# Patient Record
Sex: MALE | Race: WHITE | NOT HISPANIC OR LATINO | Employment: OTHER | URBAN - NONMETROPOLITAN AREA
[De-identification: names, ages, dates, MRNs, and addresses within clinical notes are randomized per-mention and may not be internally consistent; named-entity substitution may affect disease eponyms.]

---

## 2024-07-11 ENCOUNTER — HOSPITAL ENCOUNTER (EMERGENCY)
Facility: HOSPITAL | Age: 64
Discharge: OTHER INSTITUTION | DRG: 897 | End: 2024-07-12
Attending: EMERGENCY MEDICINE
Payer: MEDICAID

## 2024-07-11 DIAGNOSIS — F10.10 ALCOHOL ABUSE: Primary | ICD-10-CM

## 2024-07-11 LAB
ALBUMIN SERPL-MCNC: 3.5 G/DL (ref 3.5–5.2)
ALBUMIN/GLOB SERPL: 0.9 G/DL
ALP SERPL-CCNC: 109 U/L (ref 39–117)
ALT SERPL W P-5'-P-CCNC: 25 U/L (ref 1–41)
AMPHET+METHAMPHET UR QL: NEGATIVE
AMPHETAMINES UR QL: NEGATIVE
ANION GAP SERPL CALCULATED.3IONS-SCNC: 13.1 MMOL/L (ref 5–15)
AST SERPL-CCNC: 75 U/L (ref 1–40)
BARBITURATES UR QL SCN: NEGATIVE
BASOPHILS # BLD AUTO: 0.2 10*3/MM3 (ref 0–0.2)
BASOPHILS NFR BLD AUTO: 2.1 % (ref 0–1.5)
BENZODIAZ UR QL SCN: NEGATIVE
BILIRUB SERPL-MCNC: 0.3 MG/DL (ref 0–1.2)
BILIRUB UR QL STRIP: NEGATIVE
BUN SERPL-MCNC: 13 MG/DL (ref 8–23)
BUN/CREAT SERPL: 17.3 (ref 7–25)
BUPRENORPHINE SERPL-MCNC: NEGATIVE NG/ML
CALCIUM SPEC-SCNC: 8.7 MG/DL (ref 8.6–10.5)
CANNABINOIDS SERPL QL: POSITIVE
CHLORIDE SERPL-SCNC: 104 MMOL/L (ref 98–107)
CLARITY UR: CLEAR
CO2 SERPL-SCNC: 26.9 MMOL/L (ref 22–29)
COCAINE UR QL: NEGATIVE
COLOR UR: YELLOW
CREAT SERPL-MCNC: 0.75 MG/DL (ref 0.76–1.27)
DEPRECATED RDW RBC AUTO: 66.7 FL (ref 37–54)
EGFRCR SERPLBLD CKD-EPI 2021: 102 ML/MIN/1.73
EOSINOPHIL # BLD AUTO: 0.47 10*3/MM3 (ref 0–0.4)
EOSINOPHIL NFR BLD AUTO: 4.9 % (ref 0.3–6.2)
ERYTHROCYTE [DISTWIDTH] IN BLOOD BY AUTOMATED COUNT: 19.5 % (ref 12.3–15.4)
ETHANOL BLD-MCNC: 109 MG/DL (ref 0–10)
ETHANOL BLD-MCNC: 237 MG/DL (ref 0–10)
ETHANOL BLD-MCNC: 400 MG/DL (ref 0–10)
ETHANOL BLD-MCNC: 67 MG/DL (ref 0–10)
ETHANOL UR QL: 0.07 %
ETHANOL UR QL: 0.11 %
ETHANOL UR QL: 0.24 %
ETHANOL UR QL: 0.4 %
FENTANYL UR-MCNC: NEGATIVE NG/ML
GLOBULIN UR ELPH-MCNC: 3.8 GM/DL
GLUCOSE SERPL-MCNC: 105 MG/DL (ref 65–99)
GLUCOSE UR STRIP-MCNC: ABNORMAL MG/DL
HCT VFR BLD AUTO: 32.3 % (ref 37.5–51)
HGB BLD-MCNC: 10.1 G/DL (ref 13–17.7)
HGB UR QL STRIP.AUTO: NEGATIVE
IMM GRANULOCYTES # BLD AUTO: 0.05 10*3/MM3 (ref 0–0.05)
IMM GRANULOCYTES NFR BLD AUTO: 0.5 % (ref 0–0.5)
KETONES UR QL STRIP: NEGATIVE
LEUKOCYTE ESTERASE UR QL STRIP.AUTO: NEGATIVE
LYMPHOCYTES # BLD AUTO: 3.13 10*3/MM3 (ref 0.7–3.1)
LYMPHOCYTES NFR BLD AUTO: 32.5 % (ref 19.6–45.3)
MAGNESIUM SERPL-MCNC: 2 MG/DL (ref 1.6–2.4)
MCH RBC QN AUTO: 30 PG (ref 26.6–33)
MCHC RBC AUTO-ENTMCNC: 31.3 G/DL (ref 31.5–35.7)
MCV RBC AUTO: 95.8 FL (ref 79–97)
METHADONE UR QL SCN: NEGATIVE
MONOCYTES # BLD AUTO: 0.79 10*3/MM3 (ref 0.1–0.9)
MONOCYTES NFR BLD AUTO: 8.2 % (ref 5–12)
NEUTROPHILS NFR BLD AUTO: 5 10*3/MM3 (ref 1.7–7)
NEUTROPHILS NFR BLD AUTO: 51.8 % (ref 42.7–76)
NITRITE UR QL STRIP: NEGATIVE
NRBC BLD AUTO-RTO: 0 /100 WBC (ref 0–0.2)
OPIATES UR QL: NEGATIVE
OXYCODONE UR QL SCN: NEGATIVE
PCP UR QL SCN: NEGATIVE
PH UR STRIP.AUTO: 6.5 [PH] (ref 5–8)
PLATELET # BLD AUTO: 219 10*3/MM3 (ref 140–450)
PMV BLD AUTO: 8.8 FL (ref 6–12)
POTASSIUM SERPL-SCNC: 3.6 MMOL/L (ref 3.5–5.2)
PROT SERPL-MCNC: 7.3 G/DL (ref 6–8.5)
PROT UR QL STRIP: ABNORMAL
RBC # BLD AUTO: 3.37 10*6/MM3 (ref 4.14–5.8)
SODIUM SERPL-SCNC: 144 MMOL/L (ref 136–145)
SP GR UR STRIP: 1.01 (ref 1–1.03)
TRICYCLICS UR QL SCN: NEGATIVE
UROBILINOGEN UR QL STRIP: ABNORMAL
WBC NRBC COR # BLD AUTO: 9.64 10*3/MM3 (ref 3.4–10.8)

## 2024-07-11 PROCEDURE — 63710000001 DIPHENHYDRAMINE PER 50 MG: Performed by: NURSE PRACTITIONER

## 2024-07-11 PROCEDURE — 82077 ASSAY SPEC XCP UR&BREATH IA: CPT | Performed by: PHYSICIAN ASSISTANT

## 2024-07-11 PROCEDURE — 80307 DRUG TEST PRSMV CHEM ANLYZR: CPT | Performed by: PHYSICIAN ASSISTANT

## 2024-07-11 PROCEDURE — 36415 COLL VENOUS BLD VENIPUNCTURE: CPT

## 2024-07-11 PROCEDURE — 80053 COMPREHEN METABOLIC PANEL: CPT | Performed by: PHYSICIAN ASSISTANT

## 2024-07-11 PROCEDURE — 25810000003 SODIUM CHLORIDE 0.9 % SOLUTION: Performed by: PHYSICIAN ASSISTANT

## 2024-07-11 PROCEDURE — 83735 ASSAY OF MAGNESIUM: CPT | Performed by: PHYSICIAN ASSISTANT

## 2024-07-11 PROCEDURE — 25010000002 ONDANSETRON PER 1 MG: Performed by: PHYSICIAN ASSISTANT

## 2024-07-11 PROCEDURE — 99285 EMERGENCY DEPT VISIT HI MDM: CPT

## 2024-07-11 PROCEDURE — 96365 THER/PROPH/DIAG IV INF INIT: CPT

## 2024-07-11 PROCEDURE — 82077 ASSAY SPEC XCP UR&BREATH IA: CPT | Performed by: NURSE PRACTITIONER

## 2024-07-11 PROCEDURE — 96375 TX/PRO/DX INJ NEW DRUG ADDON: CPT

## 2024-07-11 PROCEDURE — 85025 COMPLETE CBC W/AUTO DIFF WBC: CPT | Performed by: PHYSICIAN ASSISTANT

## 2024-07-11 PROCEDURE — 81003 URINALYSIS AUTO W/O SCOPE: CPT | Performed by: PHYSICIAN ASSISTANT

## 2024-07-11 PROCEDURE — 96361 HYDRATE IV INFUSION ADD-ON: CPT

## 2024-07-11 PROCEDURE — 25010000002 THIAMINE HCL 200 MG/2ML SOLUTION: Performed by: PHYSICIAN ASSISTANT

## 2024-07-11 RX ORDER — ONDANSETRON 2 MG/ML
4 INJECTION INTRAMUSCULAR; INTRAVENOUS ONCE
Status: COMPLETED | OUTPATIENT
Start: 2024-07-11 | End: 2024-07-11

## 2024-07-11 RX ORDER — DIPHENHYDRAMINE HCL 25 MG
25 CAPSULE ORAL ONCE
Status: COMPLETED | OUTPATIENT
Start: 2024-07-11 | End: 2024-07-11

## 2024-07-11 RX ORDER — LORAZEPAM 2 MG/1
2 TABLET ORAL ONCE
Status: COMPLETED | OUTPATIENT
Start: 2024-07-11 | End: 2024-07-11

## 2024-07-11 RX ORDER — SODIUM CHLORIDE 0.9 % (FLUSH) 0.9 %
10 SYRINGE (ML) INJECTION AS NEEDED
Status: DISCONTINUED | OUTPATIENT
Start: 2024-07-11 | End: 2024-07-12 | Stop reason: HOSPADM

## 2024-07-11 RX ORDER — SODIUM CHLORIDE 9 MG/ML
1000 INJECTION, SOLUTION INTRAVENOUS ONCE
Status: COMPLETED | OUTPATIENT
Start: 2024-07-11 | End: 2024-07-11

## 2024-07-11 RX ORDER — CHLORDIAZEPOXIDE HYDROCHLORIDE 25 MG/1
50 CAPSULE, GELATIN COATED ORAL ONCE
Status: COMPLETED | OUTPATIENT
Start: 2024-07-11 | End: 2024-07-11

## 2024-07-11 RX ORDER — THIAMINE HYDROCHLORIDE 100 MG/ML
200 INJECTION, SOLUTION INTRAMUSCULAR; INTRAVENOUS ONCE
Status: COMPLETED | OUTPATIENT
Start: 2024-07-11 | End: 2024-07-11

## 2024-07-11 RX ADMIN — THIAMINE HYDROCHLORIDE 200 MG: 100 INJECTION, SOLUTION INTRAMUSCULAR; INTRAVENOUS at 10:29

## 2024-07-11 RX ADMIN — SODIUM CHLORIDE 1000 ML: 9 INJECTION, SOLUTION INTRAVENOUS at 10:30

## 2024-07-11 RX ADMIN — CHLORDIAZEPOXIDE HYDROCHLORIDE 50 MG: 25 CAPSULE ORAL at 23:11

## 2024-07-11 RX ADMIN — LORAZEPAM 2 MG: 2 TABLET ORAL at 19:41

## 2024-07-11 RX ADMIN — ONDANSETRON 4 MG: 2 INJECTION INTRAMUSCULAR; INTRAVENOUS at 19:41

## 2024-07-11 RX ADMIN — DIPHENHYDRAMINE HYDROCHLORIDE 25 MG: 25 CAPSULE ORAL at 23:11

## 2024-07-11 RX ADMIN — FOLIC ACID 1 MG: 5 INJECTION, SOLUTION INTRAMUSCULAR; INTRAVENOUS; SUBCUTANEOUS at 10:29

## 2024-07-11 NOTE — ED PROVIDER NOTES
"Subjective   History of Present Illness  63 y/o male that presents to emergency department with c/c \"Alcohol abuse\" X several days. Patient states that drank just prior to arrival.  Patient does state that he is suicidal. Patient denies any other associated complaints.     History provided by:  Patient   used: No    Alcohol Intoxication  Location:  Alcohol use  Severity:  Moderate  Onset quality:  Gradual  Duration:  2 days  Timing:  Intermittent  Progression:  Worsening  Chronicity:  New  Associated symptoms: no chest pain, no cough, no diarrhea, no ear pain, no fever, no headaches, no loss of consciousness, no myalgias, no nausea, no rhinorrhea, no shortness of breath, no sore throat and no vomiting    Mental Health Problem  Presenting symptoms: agitation and suicidal thoughts    Presenting symptoms: no delusions, no depression, no disorganized speech, no homicidal ideas and no paranoid behavior    Patient accompanied by:  Caregiver  Degree of incapacity (severity):  Moderate  Onset quality:  Gradual  Duration:  2 days  Timing:  Intermittent  Progression:  Worsening  Chronicity:  New  Context: not alcohol use, not drug abuse, not noncompliant and not recent medication change    Treatment compliance:  Untreated  Time since last psychoactive medication taken:  2 days  Relieved by:  Nothing  Associated symptoms: no appetite change, no chest pain, no headaches, no hyperventilation and no insomnia    Risk factors: no family hx of mental illness, no family violence, no hx of mental illness, no hx of suicide attempts and no neurological disease        Review of Systems   Constitutional: Negative.  Negative for appetite change and fever.   HENT: Negative.  Negative for ear pain, rhinorrhea and sore throat.    Eyes: Negative.  Negative for photophobia, pain and itching.   Respiratory: Negative.  Negative for cough and shortness of breath.    Cardiovascular: Negative.  Negative for chest pain. "   Gastrointestinal: Negative.  Negative for diarrhea, nausea and vomiting.   Endocrine: Negative.  Negative for heat intolerance, polydipsia and polyphagia.   Genitourinary: Negative.  Negative for flank pain, frequency, hematuria, penile pain and scrotal swelling.   Musculoskeletal: Negative.  Negative for back pain, gait problem and myalgias.   Skin: Negative.  Negative for pallor.   Allergic/Immunologic: Negative.    Neurological: Negative.  Negative for loss of consciousness, speech difficulty, numbness and headaches.   Hematological: Negative.    Psychiatric/Behavioral: Negative.  Positive for agitation and suicidal ideas. Negative for behavioral problems, confusion, decreased concentration, dysphoric mood, homicidal ideas and paranoia. The patient does not have insomnia.    All other systems reviewed and are negative.      History reviewed. No pertinent past medical history.    No Known Allergies    History reviewed. No pertinent surgical history.    History reviewed. No pertinent family history.    Social History     Socioeconomic History    Marital status:            Objective   Physical Exam  Vitals and nursing note reviewed.   Constitutional:       General: He is not in acute distress.     Appearance: Normal appearance. He is normal weight. He is not ill-appearing, toxic-appearing or diaphoretic.   HENT:      Head: Normocephalic and atraumatic.      Right Ear: Tympanic membrane, ear canal and external ear normal. There is no impacted cerumen.      Left Ear: Tympanic membrane, ear canal and external ear normal. There is no impacted cerumen.      Nose: Nose normal. No congestion or rhinorrhea.      Mouth/Throat:      Mouth: Mucous membranes are moist.      Pharynx: Oropharynx is clear. No oropharyngeal exudate.   Eyes:      General: No scleral icterus.        Right eye: No discharge.         Left eye: No discharge.      Extraocular Movements: Extraocular movements intact.      Conjunctiva/sclera:  Conjunctivae normal.      Pupils: Pupils are equal, round, and reactive to light.   Cardiovascular:      Rate and Rhythm: Normal rate and regular rhythm.      Pulses: Normal pulses.      Heart sounds: Normal heart sounds. No murmur heard.     No friction rub. No gallop.   Pulmonary:      Effort: Pulmonary effort is normal. No respiratory distress.      Breath sounds: Normal breath sounds. No stridor. No wheezing, rhonchi or rales.   Abdominal:      General: Abdomen is flat. Bowel sounds are normal. There is no distension.      Palpations: Abdomen is soft. There is no mass.      Tenderness: There is no abdominal tenderness. There is no right CVA tenderness, guarding or rebound.      Hernia: No hernia is present.   Musculoskeletal:         General: No swelling, tenderness, deformity or signs of injury. Normal range of motion.      Cervical back: Normal range of motion and neck supple. No rigidity or tenderness.      Right lower leg: No edema.   Lymphadenopathy:      Cervical: No cervical adenopathy.   Skin:     General: Skin is warm and dry.      Capillary Refill: Capillary refill takes less than 2 seconds.      Coloration: Skin is not jaundiced or pale.      Findings: No bruising, erythema or lesion.   Neurological:      General: No focal deficit present.      Mental Status: He is alert and oriented to person, place, and time. Mental status is at baseline.      Cranial Nerves: No cranial nerve deficit.      Sensory: No sensory deficit.      Motor: No weakness.      Coordination: Coordination normal.      Gait: Gait normal.   Psychiatric:         Mood and Affect: Mood normal.         Behavior: Behavior normal.         Thought Content: Thought content includes suicidal ideation. Thought content includes suicidal plan.         Judgment: Judgment normal.         Procedures           ED Course                                             Medical Decision Making  Amount and/or Complexity of Data Reviewed  Labs:  ordered.    Risk  Prescription drug management.        Final diagnoses:   Alcohol abuse       ED Disposition  ED Disposition       ED Disposition   DC/Transfer to Behavioral Health    Condition   Stable    Comment   --               No follow-up provider specified.       Medication List      No changes were made to your prescriptions during this visit.            Bonifacio Quinonez PA-C  07/11/24 [id number]

## 2024-07-12 ENCOUNTER — HOSPITAL ENCOUNTER (INPATIENT)
Facility: HOSPITAL | Age: 64
LOS: 6 days | Discharge: HOME | DRG: 897 | End: 2024-07-18
Attending: STUDENT IN AN ORGANIZED HEALTH CARE EDUCATION/TRAINING PROGRAM | Admitting: STUDENT IN AN ORGANIZED HEALTH CARE EDUCATION/TRAINING PROGRAM
Payer: MEDICAID

## 2024-07-12 VITALS
SYSTOLIC BLOOD PRESSURE: 136 MMHG | BODY MASS INDEX: 24.25 KG/M2 | HEIGHT: 68 IN | DIASTOLIC BLOOD PRESSURE: 85 MMHG | WEIGHT: 160 LBS | OXYGEN SATURATION: 100 % | TEMPERATURE: 98.6 F | RESPIRATION RATE: 18 BRPM | HEART RATE: 104 BPM

## 2024-07-12 DIAGNOSIS — T14.8XXA WOUND OF SKIN: Primary | ICD-10-CM

## 2024-07-12 PROBLEM — F10.10 ALCOHOL ABUSE: Status: ACTIVE | Noted: 2024-07-12

## 2024-07-12 PROBLEM — F10.20 ALCOHOL USE DISORDER, SEVERE, DEPENDENCE: Status: ACTIVE | Noted: 2024-07-12

## 2024-07-12 PROBLEM — F10.939 ALCOHOL WITHDRAWAL: Status: ACTIVE | Noted: 2024-07-12

## 2024-07-12 LAB
HAV IGM SERPL QL IA: NORMAL
HBV CORE IGM SERPL QL IA: NORMAL
HBV SURFACE AG SERPL QL IA: NORMAL
HCV AB SER QL: NORMAL
QT INTERVAL: 378 MS
QTC INTERVAL: 504 MS

## 2024-07-12 PROCEDURE — 93005 ELECTROCARDIOGRAM TRACING: CPT | Performed by: STUDENT IN AN ORGANIZED HEALTH CARE EDUCATION/TRAINING PROGRAM

## 2024-07-12 PROCEDURE — 80074 ACUTE HEPATITIS PANEL: CPT | Performed by: STUDENT IN AN ORGANIZED HEALTH CARE EDUCATION/TRAINING PROGRAM

## 2024-07-12 PROCEDURE — HZ2ZZZZ DETOXIFICATION SERVICES FOR SUBSTANCE ABUSE TREATMENT: ICD-10-PCS | Performed by: PSYCHIATRY & NEUROLOGY

## 2024-07-12 PROCEDURE — 99223 1ST HOSP IP/OBS HIGH 75: CPT | Performed by: PSYCHIATRY & NEUROLOGY

## 2024-07-12 PROCEDURE — 93010 ELECTROCARDIOGRAM REPORT: CPT | Performed by: SPECIALIST

## 2024-07-12 RX ORDER — FUROSEMIDE 40 MG/1
40 TABLET ORAL DAILY
COMMUNITY

## 2024-07-12 RX ORDER — LORAZEPAM 1 MG/1
1 TABLET ORAL EVERY 4 HOURS PRN
Status: ACTIVE | OUTPATIENT
Start: 2024-07-15 | End: 2024-07-16

## 2024-07-12 RX ORDER — SUCRALFATE 1 G/1
1 TABLET ORAL 3 TIMES DAILY
COMMUNITY

## 2024-07-12 RX ORDER — POLYETHYLENE GLYCOL 3350 17 G/17G
17 POWDER, FOR SOLUTION ORAL DAILY PRN
Status: DISCONTINUED | OUTPATIENT
Start: 2024-07-12 | End: 2024-07-18 | Stop reason: HOSPADM

## 2024-07-12 RX ORDER — BENZONATATE 100 MG/1
100 CAPSULE ORAL 3 TIMES DAILY PRN
Status: DISCONTINUED | OUTPATIENT
Start: 2024-07-12 | End: 2024-07-18 | Stop reason: HOSPADM

## 2024-07-12 RX ORDER — LORAZEPAM 2 MG/1
2 TABLET ORAL
Status: DISPENSED | OUTPATIENT
Start: 2024-07-12 | End: 2024-07-13

## 2024-07-12 RX ORDER — METOPROLOL SUCCINATE 50 MG/1
50 TABLET, EXTENDED RELEASE ORAL DAILY
Status: DISCONTINUED | OUTPATIENT
Start: 2024-07-13 | End: 2024-07-18 | Stop reason: HOSPADM

## 2024-07-12 RX ORDER — MULTIVITAMIN WITH IRON
2 TABLET ORAL DAILY
Status: DISCONTINUED | OUTPATIENT
Start: 2024-07-12 | End: 2024-07-18 | Stop reason: HOSPADM

## 2024-07-12 RX ORDER — TRAZODONE HYDROCHLORIDE 50 MG/1
50 TABLET ORAL NIGHTLY PRN
Status: DISCONTINUED | OUTPATIENT
Start: 2024-07-12 | End: 2024-07-18 | Stop reason: HOSPADM

## 2024-07-12 RX ORDER — FAMOTIDINE 20 MG/1
20 TABLET, FILM COATED ORAL 2 TIMES DAILY PRN
Status: DISCONTINUED | OUTPATIENT
Start: 2024-07-12 | End: 2024-07-18 | Stop reason: HOSPADM

## 2024-07-12 RX ORDER — METOPROLOL SUCCINATE 50 MG/1
50 TABLET, EXTENDED RELEASE ORAL DAILY
COMMUNITY

## 2024-07-12 RX ORDER — NICOTINE 21 MG/24HR
1 PATCH, TRANSDERMAL 24 HOURS TRANSDERMAL
Status: DISCONTINUED | OUTPATIENT
Start: 2024-07-12 | End: 2024-07-18 | Stop reason: HOSPADM

## 2024-07-12 RX ORDER — FUROSEMIDE 20 MG/1
40 TABLET ORAL DAILY
Status: DISCONTINUED | OUTPATIENT
Start: 2024-07-13 | End: 2024-07-18 | Stop reason: HOSPADM

## 2024-07-12 RX ORDER — MONTELUKAST SODIUM 10 MG/1
10 TABLET ORAL NIGHTLY
Status: DISCONTINUED | OUTPATIENT
Start: 2024-07-12 | End: 2024-07-18 | Stop reason: HOSPADM

## 2024-07-12 RX ORDER — BENZTROPINE MESYLATE 1 MG/1
2 TABLET ORAL ONCE AS NEEDED
Status: DISCONTINUED | OUTPATIENT
Start: 2024-07-12 | End: 2024-07-18 | Stop reason: HOSPADM

## 2024-07-12 RX ORDER — HYDROXYZINE 50 MG/1
50 TABLET, FILM COATED ORAL EVERY 6 HOURS PRN
Status: DISCONTINUED | OUTPATIENT
Start: 2024-07-12 | End: 2024-07-18 | Stop reason: HOSPADM

## 2024-07-12 RX ORDER — MONTELUKAST SODIUM 10 MG/1
10 TABLET ORAL NIGHTLY
COMMUNITY

## 2024-07-12 RX ORDER — SUCRALFATE 1 G/1
1 TABLET ORAL 3 TIMES DAILY
Status: DISCONTINUED | OUTPATIENT
Start: 2024-07-12 | End: 2024-07-18 | Stop reason: HOSPADM

## 2024-07-12 RX ORDER — ACETAMINOPHEN 325 MG/1
650 TABLET ORAL EVERY 6 HOURS PRN
Status: DISCONTINUED | OUTPATIENT
Start: 2024-07-12 | End: 2024-07-18 | Stop reason: HOSPADM

## 2024-07-12 RX ORDER — LORAZEPAM 2 MG/1
2 TABLET ORAL EVERY 4 HOURS PRN
Status: DISPENSED | OUTPATIENT
Start: 2024-07-13 | End: 2024-07-14

## 2024-07-12 RX ORDER — LORAZEPAM 1 MG/1
1 TABLET ORAL
Status: COMPLETED | OUTPATIENT
Start: 2024-07-15 | End: 2024-07-15

## 2024-07-12 RX ORDER — LORAZEPAM 0.5 MG/1
0.5 TABLET ORAL EVERY 4 HOURS PRN
Status: ACTIVE | OUTPATIENT
Start: 2024-07-16 | End: 2024-07-17

## 2024-07-12 RX ORDER — ONDANSETRON 4 MG/1
4 TABLET, ORALLY DISINTEGRATING ORAL EVERY 6 HOURS PRN
Status: DISCONTINUED | OUTPATIENT
Start: 2024-07-12 | End: 2024-07-18 | Stop reason: HOSPADM

## 2024-07-12 RX ORDER — MULTIPLE VITAMINS W/ MINERALS TAB 9MG-400MCG
1 TAB ORAL DAILY
Status: DISCONTINUED | OUTPATIENT
Start: 2024-07-12 | End: 2024-07-18 | Stop reason: HOSPADM

## 2024-07-12 RX ORDER — LORAZEPAM 2 MG/1
2 TABLET ORAL
Status: COMPLETED | OUTPATIENT
Start: 2024-07-13 | End: 2024-07-13

## 2024-07-12 RX ORDER — LORAZEPAM 0.5 MG/1
0.5 TABLET ORAL
Status: COMPLETED | OUTPATIENT
Start: 2024-07-16 | End: 2024-07-16

## 2024-07-12 RX ORDER — ECHINACEA PURPUREA EXTRACT 125 MG
2 TABLET ORAL AS NEEDED
Status: DISCONTINUED | OUTPATIENT
Start: 2024-07-12 | End: 2024-07-18 | Stop reason: HOSPADM

## 2024-07-12 RX ORDER — ALUMINA, MAGNESIA, AND SIMETHICONE 2400; 2400; 240 MG/30ML; MG/30ML; MG/30ML
15 SUSPENSION ORAL EVERY 6 HOURS PRN
Status: DISCONTINUED | OUTPATIENT
Start: 2024-07-12 | End: 2024-07-18 | Stop reason: HOSPADM

## 2024-07-12 RX ORDER — LOPERAMIDE HYDROCHLORIDE 2 MG/1
2 CAPSULE ORAL
Status: DISCONTINUED | OUTPATIENT
Start: 2024-07-12 | End: 2024-07-18 | Stop reason: HOSPADM

## 2024-07-12 RX ORDER — BENZTROPINE MESYLATE 1 MG/ML
1 INJECTION INTRAMUSCULAR; INTRAVENOUS ONCE AS NEEDED
Status: DISCONTINUED | OUTPATIENT
Start: 2024-07-12 | End: 2024-07-18 | Stop reason: HOSPADM

## 2024-07-12 RX ORDER — IBUPROFEN 400 MG/1
400 TABLET ORAL EVERY 6 HOURS PRN
Status: DISCONTINUED | OUTPATIENT
Start: 2024-07-12 | End: 2024-07-18 | Stop reason: HOSPADM

## 2024-07-12 RX ORDER — DAPAGLIFLOZIN 10 MG/1
10 TABLET, FILM COATED ORAL DAILY
COMMUNITY

## 2024-07-12 RX ORDER — LORAZEPAM 2 MG/1
4 TABLET ORAL ONCE
Status: COMPLETED | OUTPATIENT
Start: 2024-07-12 | End: 2024-07-12

## 2024-07-12 RX ADMIN — Medication 2 TABLET: at 08:42

## 2024-07-12 RX ADMIN — Medication 1 PATCH: at 08:43

## 2024-07-12 RX ADMIN — HYDROXYZINE HYDROCHLORIDE 50 MG: 50 TABLET ORAL at 21:31

## 2024-07-12 RX ADMIN — MONTELUKAST SODIUM 10 MG: 10 TABLET, COATED ORAL at 21:48

## 2024-07-12 RX ADMIN — SUCRALFATE 1 G: 1 TABLET ORAL at 21:48

## 2024-07-12 RX ADMIN — LORAZEPAM 2 MG: 2 TABLET ORAL at 21:31

## 2024-07-12 RX ADMIN — Medication 100 MG: at 08:42

## 2024-07-12 RX ADMIN — SACUBITRIL AND VALSARTAN 1 TABLET: 24; 26 TABLET, FILM COATED ORAL at 21:48

## 2024-07-12 RX ADMIN — Medication 1 TABLET: at 08:42

## 2024-07-12 RX ADMIN — LORAZEPAM 2 MG: 2 TABLET ORAL at 04:28

## 2024-07-12 RX ADMIN — TRAZODONE HYDROCHLORIDE 50 MG: 50 TABLET ORAL at 02:20

## 2024-07-12 RX ADMIN — HYDROXYZINE HYDROCHLORIDE 50 MG: 50 TABLET ORAL at 02:21

## 2024-07-12 RX ADMIN — LORAZEPAM 4 MG: 2 TABLET ORAL at 02:20

## 2024-07-12 NOTE — PLAN OF CARE
Goal Outcome Evaluation:        Problem: Adult Behavioral Health Plan of Care  Goal: Patient-Specific Goal (Individualization)  Outcome: Ongoing, Progressing  Flowsheets  Taken 7/12/2024 1028  Patient-Specific Goals (Include Timeframe): Identify 2-3 coping skills, address relapse prevention methods, complete aftercare plans, and deny SI/HI prior to discharge.  Individualized Care Needs: Therapist to offer 1-4 therapy sessions, aftercare planning, safety planning, family education, group therapy, and brief CBT/MI interventions.  Anxieties, Fears or Concerns: none verbalized  Taken 7/12/2024 1014  Patient Personal Strengths:   resilient   resourceful   motivated for treatment   motivated for recovery  Patient Vulnerabilities:   substance abuse/addiction   history of unsuccessful treatment   poor physical health   poor impulse control   lacks insight into illness  Goal: Optimized Coping Skills in Response to Life Stressors  Outcome: Ongoing, Progressing  Flowsheets (Taken 7/12/2024 1028)  Optimized Coping Skills in Response to Life Stressors: making progress toward outcome  Intervention: Promote Effective Coping Strategies  Flowsheets (Taken 7/12/2024 0720 by April Higgins RN)  Supportive Measures:   active listening utilized   counseling provided   decision-making supported   goal-setting facilitated   positive reinforcement provided   self-care encouraged   verbalization of feelings encouraged  Goal: Develops/Participates in Therapeutic [place] to Support Successful Transition  Outcome: Ongoing, Progressing  Flowsheets (Taken 7/12/2024 1028)  Develops/Participates in Therapeutic [place] to Support Successful Transition: making progress toward outcome  Intervention: Foster Therapeutic [place]  Flowsheets (Taken 7/12/2024 1028)  Trust Relationship/Rapport:   care explained   questions encouraged   choices provided   reassurance provided   emotional support provided   thoughts/feelings acknowledged   empathic  listening provided   questions answered  Intervention: Mutually Develop Transition Plan  Flowsheets  Taken 7/12/2024 1028  Outpatient/Agency/Support Group Needs:   residential services   outpatient substance abuse treatment (specify)   outpatient psychiatric care (specify)   intensive outpatient services   outpatient medication management   outpatient counseling  Transition Support:   follow-up care discussed   follow-up care coordinated   community resources reviewed   crisis management plan promoted   crisis management plan verbalized  Anticipated Discharge Disposition:   residential substance use unit   home or self-care  Taken 7/12/2024 1027  Discharge Coordination/Progress: Patient is Medicaid pending. Therapist met with patient to complete assessment.  Transportation Anticipated:   agency   family or friend will provide   public transportation  Transportation Concerns: none  Current Discharge Risk: substance use/abuse  Concerns to be Addressed:   substance/tobacco abuse/use   coping/stress   cognitive/perceptual   mental health   discharge planning  Readmission Within the Last 30 Days: no previous admission in last 30 days  Patient/Family Anticipated Services at Transition:   rehabilitation services   mental health services   outpatient care  Patient's Choice of Community Agency(s): To be determined  Patient/Family Anticipates Transition to:   inpatient rehabilitation facility   home  Offered/Gave Vendor List: yes      DATA:      Therapist met individually with patient this date to introduce role and to discuss hospitalization expectations. Patient agreeable.     Consent in chartlet for wife and son.     Clinical Maneuvering/Intervention:     Therapist assisted patient in processing session content; acknowledged and normalized patient’s thoughts, feelings, and concerns. Discussed the therapist/patient relationship and explain the parameters and limitations of relative confidentiality. Also discussed the  importance of active participation, and honesty to the treatment process. Encouraged the patient to discuss/vent their feelings, frustrations, and fears concerning their ongoing medical issues and validated their feelings.     Discussed the importance of finding enjoyable activities and coping skills that the patient can engage in a regular basis. Discussed healthy coping skills such as distraction, self love, grounding, thought challenges/reframing, etc. Provided patient with list of healthy coping skills this date. Discussed the importance of medication compliance. Praised the patient for seeking help and spent the majority of the session building rapport.       Allowed patient to freely discuss issues without interruption or judgment. Provided safe, confidential environment to facilitate the development of positive therapeutic relationship and encourage open, honest communication.      Therapist addressed discharge safety planning this date. Assisted patient in identifying risk factors which would indicate the need for higher level of care after discharge;  including thoughts to harm self or others and/or self-harming behavior. Encouraged patient to call 911, or present to the nearest emergency room should any of these events occur. Discussed crisis intervention services and means to access. Encouraged securing any objects of harm.       Therapist completed integrated summary, treatment plan, and initiated social history this date. Therapist is strongly encouraging family involvement in treatment.       ASSESSMENT:      The patient is a 65 y/o male admitted for alcohol detox treatment. Therapist completed new patient assessment on this date. Patient denies SI/HI/AVH. Patient experiencing weakness, tremors, and some confusion. Patient requiring sitter due to unsteady gait and need for redirection. Patient has had no past [facility] admissions. He first started drinking at 17 y/o, longest period of sobriety  has been about 1 week. Patient lives at home with family and states he will start receiving SSI income in August. Patient is unsure of aftercare at this time, reviewed residential vs outpatient options. He is agreeable to his wife and son being involved in his treatment. He denies having any additional needs or concerns at this time.      PLAN:       Patient to remain hospitalized this date.     Treatment team will focus efforts on stabilizing patient's acute symptoms while providing education on healthy coping and crisis management to reduce hospitalizations. Patient requires daily psychiatrist evaluation and 24/7 nursing supervision to promote patient safety.     Therapist will offer 1-4 individual sessions, 1 therapy group daily, family education, and appropriate referral.    Therapist recommends [facility] residential rehab.

## 2024-07-12 NOTE — NURSING NOTE
Patient presents into intake with hx of alcohol abuse for the past 10 years. Pt was brought in by his sister Vee Westfall. Pt drinks 1/5 bourbon daily for the past 10 years. Pt admits trying to cut back and lasted 3 days. Pt wants to detox off of alcohol and go to rehab after detox. Pt last drank a pint this morning 07/11/2024. Pt alcohol level upon admission 400. Pt has hx of cardiovascular disease, alcohol addiction, GI bleed. Pt on 2 liters of O2. Appetite and sleep poor. Pt has open sore on right bicep, 3 on his upper chest, and multiple sores on upper back.     Anxiety 6/10  Depression 2/10  Cravings 5/10    Labs:   Creatinine 0.75  Glucose 105  AST 75  RBC 3.37  Hemoglobin 10.1  Hematocrit 32.3    UA:   Glucose 500 mg  Protein trace    UDS:   THC +

## 2024-07-12 NOTE — PLAN OF CARE
Goal Outcome Evaluation:  Plan of Care Reviewed With: patient  Patient Agreement with Plan of Care: agrees         Pt admit from [facility] ED.  Reports use of [place] 1/5 daily for 10 years and occasional THC. Per patient age of first drink 18 years of age and longest period of sobriety is 1 week. Medical history of CHF, cardiovascular disease, COPD, HX of GI bleed and HX of ulcers. Pt currently has scattered wounds both open and scabbed from picking over body and in scalp. Pt denies any prior psychiatric or detox admissions. UDS positive for THC.

## 2024-07-12 NOTE — NURSING NOTE
Pt alert, oriented, talkative with staff. Sitter ambulated pt in room, bathroom and down length of unit. Pt did not require assistance and gait steady at this time.     Contacted Dr. He, obtained order to d/c sitter. Pt moved to room 41b. Lead, IESHA Islas, made aware.

## 2024-07-12 NOTE — NURSING NOTE
Spoke to Dr. Santos via phone. Intake information provided current labs and vitals. Instructed to admit the patient to detox unit. Admit orders received SP4 routine orders and Ativan detox, Ativan 4 mg once patient gets to detox floor;Order for oxygen 2 liters RBVOX2. Patient and ED provider made aware of admission and plan of care. Safety precautions maintained.

## 2024-07-12 NOTE — NURSING NOTE
Contacted Dr. He, advised MD that pt is unsteady, generalized weakness, forgetful, requires frequent redirection when out-of-bed. Obtained order for sitter due to falls risk. RBTO x2. Lead, IESHA Islas, made aware of new order.

## 2024-07-12 NOTE — NURSING NOTE
Spoke with ED provider Giselle concerning patient tremors and itching; medication discussed; medication ordered; SEE MAR

## 2024-07-12 NOTE — NURSING NOTE
Wound consult for wound of unknown ideology to the scalp. LT AC, Thoracic spine, LT upper sternal area x 3, an RT throat.     LT upper sternal area presents with 2 open areas with pink and blanchable rusty wound skin. There is a small amount of creamy yellowish drainage and a pink wound bed. New order for Thera honey and cover with a paper border island dressing daily.     All other area have dry scabs to be painted with betadine Daily and leave open to air.    07/12/24 1216   Wound 07/12/24 0147 Left antecubital   Placement Date/Time: 07/12/24 0147   Side: Left  Location: antecubital   Wound Image   (see media)   Pressure Injury Stage O  (wound of unknow ideology)   Periwound dry;redness;blanchable   Periwound Temperature warm   Periwound Skin Turgor soft   Drainage Amount none   Care, Wound   (Paint with betadine and leave open to air-daily)   Wound 07/12/24 0149 upper thoracic spine   Placement Date/Time: 07/12/24 0149   Orientation: upper  Location: thoracic spine   Wound Image   (see media)   Pressure Injury Stage O  (Wound of unknown ideology)   Periwound blanchable;redness  (scabbed)   Periwound Temperature warm   Periwound Skin Turgor soft   Drainage Amount none   Care, Wound   (Paint with betadine and leave open to air)   Wound 07/12/24 0152 Left upper sternal   Placement Date/Time: 07/12/24 0152   Side: Left  Orientation: upper  Location: sternal   Wound Image   (see media)   Pressure Injury Stage   (wounds of unknown ideology)   Base yellow;moist   Red (%), Wound Tissue Color 80   Yellow (%), Wound Tissue Color 20   Periwound blanchable;redness   Periwound Temperature warm   Periwound Skin Turgor soft   Drainage Characteristics/Odor yellow   Drainage Amount small   Care, Wound   (Clease with NS, apply Thera-honey and cover with a paper border island dressing daily.)   Dressing Care   (island dressing)   Periwound Care dry periwound area maintained   Wound 07/12/24 0153 scalp   Placement Date/Time: 07/12/24  0153   Location: scalp   Wound Image   (see media)   Pressure Injury Stage   (wound of unknow ideology)   Base dry;scab   Periwound dry;intact  (scab to edges of the wound.)   Periwound Temperature warm   Periwound Skin Turgor soft   Edges other (see comments)  (scabbed)   Drainage Amount none   Care, Wound   (Paint with Betadine daily.)   Wound 07/12/24 0203 Right throat   Placement Date/Time: 07/12/24 0203   Side: Right  Location: throat   Wound Image   (see media)   Pressure Injury Stage O  (wound of unknown ideology.)   Periwound dry;intact   Periwound Temperature warm   Periwound Skin Turgor soft   Edges other (see comments)  (scabbed)   Drainage Amount none   Care, Wound   (Paint with betadine and leave open to air)

## 2024-07-12 NOTE — PLAN OF CARE
Goal Outcome Evaluation:  Plan of Care Reviewed With: patient  Patient Agreement with Plan of Care: agrees     Progress: improving  Outcome Evaluation: Pt rates depression 3, anxiety 2, craving 0. Denies SI/HI/AVH.  Pt weak, unsteady, forgetful. Requires redirection and contact assist. Fall risk measures in place.    This evening pt more alert, he is oriented appropriately. Pt required sitter for falls risk this morning, but is now discontinued as pt has been safely ambulating on unit without assistance. Pt has hand bell at bedside to call for assistance. Pt has not required ativan this shift. Vitals stable.

## 2024-07-12 NOTE — NURSING NOTE
Pt remains forgetful and requiring frequent redirection. Pt assisted to his room with contact assist x1 due to unsteady gait, mild tremors. Bed alarm set. Pt instructed to not attempt out-of-bed without assistance. Lead, Janel, aware of fall risks. RN attempting to reach MD for sitter.

## 2024-07-13 LAB
QT INTERVAL: 448 MS
QTC INTERVAL: 516 MS

## 2024-07-13 PROCEDURE — 99232 SBSQ HOSP IP/OBS MODERATE 35: CPT | Performed by: PSYCHIATRY & NEUROLOGY

## 2024-07-13 PROCEDURE — 93010 ELECTROCARDIOGRAM REPORT: CPT | Performed by: INTERNAL MEDICINE

## 2024-07-13 PROCEDURE — 93005 ELECTROCARDIOGRAM TRACING: CPT | Performed by: STUDENT IN AN ORGANIZED HEALTH CARE EDUCATION/TRAINING PROGRAM

## 2024-07-13 PROCEDURE — 63710000001 DIPHENHYDRAMINE PER 50 MG: Performed by: STUDENT IN AN ORGANIZED HEALTH CARE EDUCATION/TRAINING PROGRAM

## 2024-07-13 RX ORDER — DIPHENHYDRAMINE HCL 25 MG
50 CAPSULE ORAL ONCE
Status: COMPLETED | OUTPATIENT
Start: 2024-07-13 | End: 2024-07-13

## 2024-07-13 RX ADMIN — FUROSEMIDE 40 MG: 20 TABLET ORAL at 08:49

## 2024-07-13 RX ADMIN — LORAZEPAM 2 MG: 2 TABLET ORAL at 21:16

## 2024-07-13 RX ADMIN — IBUPROFEN 400 MG: 400 TABLET, FILM COATED ORAL at 14:47

## 2024-07-13 RX ADMIN — LORAZEPAM 2 MG: 2 TABLET ORAL at 14:47

## 2024-07-13 RX ADMIN — HYDROXYZINE HYDROCHLORIDE 50 MG: 50 TABLET ORAL at 08:49

## 2024-07-13 RX ADMIN — Medication 2 TABLET: at 08:49

## 2024-07-13 RX ADMIN — SUCRALFATE 1 G: 1 TABLET ORAL at 14:47

## 2024-07-13 RX ADMIN — HYDROXYZINE HYDROCHLORIDE 50 MG: 50 TABLET ORAL at 21:16

## 2024-07-13 RX ADMIN — SACUBITRIL AND VALSARTAN 1 TABLET: 24; 26 TABLET, FILM COATED ORAL at 08:49

## 2024-07-13 RX ADMIN — Medication 100 MG: at 08:49

## 2024-07-13 RX ADMIN — EMPAGLIFLOZIN 25 MG: 25 TABLET, FILM COATED ORAL at 08:49

## 2024-07-13 RX ADMIN — MONTELUKAST SODIUM 10 MG: 10 TABLET, COATED ORAL at 21:16

## 2024-07-13 RX ADMIN — SUCRALFATE 1 G: 1 TABLET ORAL at 21:16

## 2024-07-13 RX ADMIN — TRAZODONE HYDROCHLORIDE 50 MG: 50 TABLET ORAL at 21:16

## 2024-07-13 RX ADMIN — SACUBITRIL AND VALSARTAN 1 TABLET: 24; 26 TABLET, FILM COATED ORAL at 21:16

## 2024-07-13 RX ADMIN — DIPHENHYDRAMINE HYDROCHLORIDE 50 MG: 25 CAPSULE ORAL at 04:00

## 2024-07-13 RX ADMIN — METOPROLOL SUCCINATE 50 MG: 50 TABLET, EXTENDED RELEASE ORAL at 08:49

## 2024-07-13 RX ADMIN — SUCRALFATE 1 G: 1 TABLET ORAL at 08:49

## 2024-07-13 RX ADMIN — LORAZEPAM 2 MG: 2 TABLET ORAL at 08:49

## 2024-07-13 RX ADMIN — Medication 1 TABLET: at 08:49

## 2024-07-13 RX ADMIN — HYDROXYZINE HYDROCHLORIDE 50 MG: 50 TABLET ORAL at 14:47

## 2024-07-13 RX ADMIN — IBUPROFEN 400 MG: 400 TABLET, FILM COATED ORAL at 08:49

## 2024-07-13 NOTE — PROGRESS NOTES
"   Detox Recovery Unit Progress Note   Clinician: Sonny Diaz MD  Admission Date: 7/12/2024  08:18 EDT 07/13/24    Behavioral Health Treatment Plan and Problem List: I have reviewed and approved the Behavioral Health Treatment Plan and Problem list.    Allergies  Allergies   Allergen Reactions    Bupropion Anaphylaxis       Hospital Day: 1 day      Assessment completed within view of staff    History  CC: withdrawal symptoms    Interval HPI: Patient seen and evaluated by me.  Chart reviewed. Patient is withdrawing from alcohol.  Current withdrawal symptoms include: tremors, dizziness, upset stomach, nausea, leg cramps and diaphoresis.   ROS otherwise as below.      Review of Systems   Constitutional:  Positive for chills and diaphoresis.   HENT: Negative.     Eyes: Negative.    Respiratory: Negative.     Cardiovascular: Negative.    Gastrointestinal:  Positive for abdominal pain, diarrhea and nausea.   Endocrine: Negative.    Genitourinary: Negative.    Musculoskeletal:  Positive for myalgias.   Skin: Negative.    Allergic/Immunologic: Negative.    Neurological:  Positive for dizziness and tremors.   Hematological: Negative.    Psychiatric/Behavioral:  Positive for dysphoric mood.         /93 (BP Location: Right arm, Patient Position: Standing)   Pulse 105   Temp 98.3 °F (36.8 °C) (Temporal)   Resp 18   Ht 165.1 cm (65\")   Wt 70.7 kg (155 lb 12.8 oz)   SpO2 96%   BMI 25.93 kg/m²     Mental Status Exam  Mood: dysphoric  Affect: mood-congruent   Thought Processes:  linear  Oriented X3:  yes  Thought Content: sensorium intact   Suicidal Thoughts: denies  Homicidal Thoughts: denies        Medical Decision Making:   Labs:     Lab Results (last 24 hours)       ** No results found for the last 24 hours. **              Radiology:     Imaging Results (Last 24 Hours)       ** No results found for the last 24 hours. **              EKG:     ECG/EMG Results (most recent)       Procedure Component Value Units " Date/Time    ECG 12 Lead Other; Baseline Cardiac Status [[id number]] Collected: 07/12/24 0206     Updated: 07/12/24 1020     QT Interval 378 ms      QTC Interval 504 ms     Narrative:      Test Reason : Other~  Blood Pressure :   */*   mmHG  Vent. Rate : 107 BPM     Atrial Rate : 107 BPM     P-R Int : 130 ms          QRS Dur :  78 ms      QT Int : 378 ms       P-R-T Axes :  36   3  36 degrees     QTc Int : 504 ms    Sinus tachycardia with premature atrial complexes with aberrant conduction  Cannot rule out Anterior infarct , age undetermined  Abnormal ECG  No previous ECGs available  Confirmed by Natty Polanco ([id number]) on 7/12/2024 10:19:03 AM    Referred By:            Confirmed By: Natty Polanco             Medications:  B-complex with vitamin C, 2 tablet, Oral, Daily  empagliflozin, 25 mg, Oral, Daily  furosemide, 40 mg, Oral, Daily  LORazepam, 2 mg, Oral, 3 times per day   Followed by  [START ON 7/14/2024] LORazepam, 1.5 mg, Oral, 3 times per day   Followed by  [START ON 7/15/2024] LORazepam, 1 mg, Oral, 3 times per day   Followed by  [START ON 7/16/2024] LORazepam, 0.5 mg, Oral, 3 times per day  metoprolol succinate XL, 50 mg, Oral, Daily  montelukast, 10 mg, Oral, Nightly  multivitamin with minerals, 1 tablet, Oral, Daily  nicotine, 1 patch, Transdermal, Q24H  sacubitril-valsartan, 1 tablet, Oral, BID  sucralfate, 1 g, Oral, TID  thiamine, 100 mg, Oral, Daily           All medications reviewed.      Assessment and Plan:    Alcohol use disorder, severe, dependence  Alcohol withdrawal  - Ativan detox  - Thiamine and folate    Prolonged QTc  - Hold PRN medications  - Recheck EKG in am      Skin wounds  - Patient has multiple lesions on scalp, neck, left antecubital, left upper sternal, upper thoracic spine  - Wound care consult and appreciate recommendations     Nicotine use disorder  - Encourage cessation       COPD  - Oxygen per nasal canula as needed.      Continue hospitalization for medical management of drug  withdrawal and patient safety and stabilization.  Continue individual and group chemical dependency counseling.   Therapist and treatment team will continue to assist patient in establishing plans for follow-up and rehabilitation that will serve as the next step in care once patient has completed the medical detox.    This note was generated by a scribeArsenio. The work documented in this note was completed, reviewed, and approved by the attending psychiatrist as designated Dr. Sonny Diaz electronic signature.     I, Sonny Diaz MD, personally performed the services described in this documentation as scribed by the above named individual and is both accurate and complete as of 7/13/2024.

## 2024-07-13 NOTE — PLAN OF CARE
Goal Outcome Evaluation:  Plan of Care Reviewed With: patient  Patient Agreement with Plan of Care: agrees     Progress: improving     Pt rates anxiety 4/10, depression 4/10, and denies cravings. Appetite is fair for shift. Had difficulty falling and staying asleep. Given Atarax and Ativan Prn medications and given Benadryl once time dose for itching.

## 2024-07-13 NOTE — PLAN OF CARE
Goal Outcome Evaluation:  Plan of Care Reviewed With: patient  Patient Agreement with Plan of Care: agrees     Progress: improving  Outcome Evaluation: Pt calm and cooperative this shift. Reports weakness but pt with steady gait this shift. Denies SI/HI/AVH. No distress noted.

## 2024-07-14 LAB
QT INTERVAL: 400 MS
QTC INTERVAL: 497 MS

## 2024-07-14 PROCEDURE — 99232 SBSQ HOSP IP/OBS MODERATE 35: CPT | Performed by: PSYCHIATRY & NEUROLOGY

## 2024-07-14 PROCEDURE — 93005 ELECTROCARDIOGRAM TRACING: CPT | Performed by: PSYCHIATRY & NEUROLOGY

## 2024-07-14 PROCEDURE — 93010 ELECTROCARDIOGRAM REPORT: CPT | Performed by: INTERNAL MEDICINE

## 2024-07-14 RX ORDER — CHLORDIAZEPOXIDE HYDROCHLORIDE 25 MG/1
25 CAPSULE, GELATIN COATED ORAL EVERY 6 HOURS SCHEDULED
Status: COMPLETED | OUTPATIENT
Start: 2024-07-14 | End: 2024-07-14

## 2024-07-14 RX ORDER — LORAZEPAM 2 MG/1
2 TABLET ORAL
Status: DISCONTINUED | OUTPATIENT
Start: 2024-07-14 | End: 2024-07-15

## 2024-07-14 RX ADMIN — MONTELUKAST SODIUM 10 MG: 10 TABLET, COATED ORAL at 21:24

## 2024-07-14 RX ADMIN — EMPAGLIFLOZIN 25 MG: 25 TABLET, FILM COATED ORAL at 08:19

## 2024-07-14 RX ADMIN — LORAZEPAM 1.5 MG: 1 TABLET ORAL at 08:23

## 2024-07-14 RX ADMIN — LORAZEPAM 1.5 MG: 1 TABLET ORAL at 12:43

## 2024-07-14 RX ADMIN — SACUBITRIL AND VALSARTAN 1 TABLET: 24; 26 TABLET, FILM COATED ORAL at 08:19

## 2024-07-14 RX ADMIN — LORAZEPAM 1.5 MG: 1 TABLET ORAL at 15:02

## 2024-07-14 RX ADMIN — Medication 1 TABLET: at 08:19

## 2024-07-14 RX ADMIN — LORAZEPAM 2 MG: 2 TABLET ORAL at 20:28

## 2024-07-14 RX ADMIN — LORAZEPAM 2 MG: 2 TABLET ORAL at 02:44

## 2024-07-14 RX ADMIN — SUCRALFATE 1 G: 1 TABLET ORAL at 21:24

## 2024-07-14 RX ADMIN — CHLORDIAZEPOXIDE HYDROCHLORIDE 25 MG: 25 CAPSULE ORAL at 13:17

## 2024-07-14 RX ADMIN — HYDROXYZINE HYDROCHLORIDE 50 MG: 50 TABLET ORAL at 21:24

## 2024-07-14 RX ADMIN — LORAZEPAM 2 MG: 2 TABLET ORAL at 22:30

## 2024-07-14 RX ADMIN — SACUBITRIL AND VALSARTAN 1 TABLET: 24; 26 TABLET, FILM COATED ORAL at 22:30

## 2024-07-14 RX ADMIN — LORAZEPAM 2 MG: 2 TABLET ORAL at 19:28

## 2024-07-14 RX ADMIN — LORAZEPAM 2 MG: 2 TABLET ORAL at 23:29

## 2024-07-14 RX ADMIN — LORAZEPAM 2 MG: 2 TABLET ORAL at 21:24

## 2024-07-14 RX ADMIN — METOPROLOL SUCCINATE 50 MG: 50 TABLET, EXTENDED RELEASE ORAL at 08:19

## 2024-07-14 RX ADMIN — FUROSEMIDE 40 MG: 20 TABLET ORAL at 08:19

## 2024-07-14 RX ADMIN — Medication 100 MG: at 08:19

## 2024-07-14 RX ADMIN — TRAZODONE HYDROCHLORIDE 50 MG: 50 TABLET ORAL at 21:24

## 2024-07-14 RX ADMIN — CHLORDIAZEPOXIDE HYDROCHLORIDE 25 MG: 25 CAPSULE ORAL at 17:22

## 2024-07-14 RX ADMIN — Medication 2 TABLET: at 08:19

## 2024-07-14 RX ADMIN — SUCRALFATE 1 G: 1 TABLET ORAL at 08:19

## 2024-07-14 RX ADMIN — SUCRALFATE 1 G: 1 TABLET ORAL at 15:02

## 2024-07-14 NOTE — NURSING NOTE
Contacted Dr. Fine re: /87 P122 CIWA 18, pt increasingly confused and requiring constant redirection. New orders for PRN Ativan 2mg and to make pt a sitter if needed TORBV.  Pt continued wandering hallways, trying to come behind the nurse's station and disoriented x3. Pt made sitter @2045.

## 2024-07-14 NOTE — PLAN OF CARE
"Goal Outcome Evaluation:  Plan of Care Reviewed With: patient  Patient Agreement with Plan of Care: agrees     Progress: no change  Outcome Evaluation: Pt progressively more confused this shift requiring frequent redirection, MD notified. Tremor noted but pt maintaining mostly steady gait. Pt occasionally asking about family stating \"they were just here\". Denies SI/HI. Pt encouraged to try to rest.                               "

## 2024-07-14 NOTE — PROGRESS NOTES
"   Detox Recovery Unit Progress Note   Clinician: Sonny Diaz MD  Admission Date: 7/12/2024  08:18 EDT 07/14/24    Behavioral Health Treatment Plan and Problem List: I have reviewed and approved the Behavioral Health Treatment Plan and Problem list.    Allergies  Allergies   Allergen Reactions    Bupropion Anaphylaxis       Hospital Day: 2 days      Assessment completed within view of staff    History  CC: withdrawal symptoms    Interval HPI: Patient seen and evaluated by me.  Chart reviewed. Patient is withdrawing from alcohol.   Current withdrawal symptoms include: mild tremors, unsteady gait. Patient had some confusion last night, more oriented this morning.    ROS otherwise as below.      Review of Systems   Constitutional:  Positive for activity change and appetite change.   HENT: Negative.     Eyes: Negative.    Respiratory: Negative.     Cardiovascular: Negative.    Gastrointestinal: Negative.    Endocrine: Negative.    Genitourinary: Negative.    Musculoskeletal:  Positive for gait problem.   Skin: Negative.    Allergic/Immunologic: Negative.    Neurological:  Positive for tremors.   Hematological: Negative.         BP (!) 147/101 (BP Location: Right arm, Patient Position: Sitting) Comment: 144/93 stand  Pulse 107 Comment: 111 stand  Temp 98.5 °F (36.9 °C) (Temporal)   Resp 18   Ht 165.1 cm (65\")   Wt 70.7 kg (155 lb 12.8 oz)   SpO2 96%   BMI 25.93 kg/m²     Mental Status Exam  Mood: normal  Affect: mood-congruent   Thought Processes:  linear  Oriented X3:  yes  Thought Content: sensorium intact   Suicidal Thoughts: denies  Homicidal Thoughts: denies        Medical Decision Making:   Labs:     Lab Results (last 24 hours)       ** No results found for the last 24 hours. **              Radiology:     Imaging Results (Last 24 Hours)       ** No results found for the last 24 hours. **              EKG:     ECG/EMG Results (most recent)       Procedure Component Value Units Date/Time    ECG 12 Lead " Other; Baseline Cardiac Status [[id number]] Collected: 07/12/24 0206     Updated: 07/12/24 1020     QT Interval 378 ms      QTC Interval 504 ms     Narrative:      Test Reason : Other~  Blood Pressure :   */*   mmHG  Vent. Rate : 107 BPM     Atrial Rate : 107 BPM     P-R Int : 130 ms          QRS Dur :  78 ms      QT Int : 378 ms       P-R-T Axes :  36   3  36 degrees     QTc Int : 504 ms    Sinus tachycardia with premature atrial complexes with aberrant conduction  Cannot rule out Anterior infarct , age undetermined  Abnormal ECG  No previous ECGs available  Confirmed by Natty Polanco ([id number]) on 7/12/2024 10:19:03 AM    Referred By:            Confirmed By: Natty Polanco    ECG 12 Lead QT Measurement [[id number]] Collected: 07/13/24 1437     Updated: 07/13/24 1939     QT Interval 448 ms      QTC Interval 516 ms     Narrative:      Test Reason : QT Measurement  Blood Pressure :   */*   mmHG  Vent. Rate :  80 BPM     Atrial Rate :  80 BPM     P-R Int : 134 ms          QRS Dur :  76 ms      QT Int : 448 ms       P-R-T Axes :  58  29  50 degrees     QTc Int : 516 ms    Normal sinus rhythm  Prolonged QT  Abnormal ECG  When compared with ECG of 13-JUL-2024 14:37, (Unconfirmed)  No significant change was found  Confirmed by Maximo Pike ([id number]) on 7/13/2024 7:38:18 PM    Referred By: MARLA           Confirmed By: Maximo Pike    ECG 12 Lead QT Measurement [[id number]] Collected: 07/14/24 0710     Updated: 07/14/24 0713     QT Interval 400 ms      QTC Interval 497 ms     Narrative:      Test Reason : QT Measurement  Blood Pressure :   */*   mmHG  Vent. Rate :  93 BPM     Atrial Rate :  93 BPM     P-R Int : 140 ms          QRS Dur :  74 ms      QT Int : 400 ms       P-R-T Axes :  65  61  68 degrees     QTc Int : 497 ms    Normal sinus rhythm  Nonspecific ST abnormality  Prolonged QT  Abnormal ECG  When compared with ECG of 13-JUL-2024 14:37,  No significant change was found    Referred By: DAYANA Victor  By:              Medications:  B-complex with vitamin C, 2 tablet, Oral, Daily  empagliflozin, 25 mg, Oral, Daily  furosemide, 40 mg, Oral, Daily  LORazepam, 1.5 mg, Oral, 3 times per day   Followed by  [START ON 7/15/2024] LORazepam, 1 mg, Oral, 3 times per day   Followed by  [START ON 7/16/2024] LORazepam, 0.5 mg, Oral, 3 times per day  metoprolol succinate XL, 50 mg, Oral, Daily  montelukast, 10 mg, Oral, Nightly  multivitamin with minerals, 1 tablet, Oral, Daily  nicotine, 1 patch, Transdermal, Q24H  sacubitril-valsartan, 1 tablet, Oral, BID  sucralfate, 1 g, Oral, TID  thiamine, 100 mg, Oral, Daily           All medications reviewed.      Assessment and Plan:    Alcohol use disorder, severe, dependence  Alcohol withdrawal  - Ativan detox  - Thiamine and folate  Providing several doses of Librium 25mg today (6hours apart) to hopefully limit progression, continue if indicated.     Prolonged Qtc  - Patient asymptomatic   - Hold PRN medications  - Continue to monitor      Skin wounds  - Patient has multiple lesions on scalp, neck, left antecubital, left upper sternal, upper thoracic spine  - Wound care consult and appreciate recommendations     Nicotine use disorder  - Encourage cessation       COPD  - Oxygen per nasal canula as needed.      Continue hospitalization for medical management of drug withdrawal and patient safety and stabilization.  Continue individual and group chemical dependency counseling.   Therapist and treatment team will continue to assist patient in establishing plans for follow-up and rehabilitation that will serve as the next step in care once patient has completed the medical detox.    This note was generated by a scribe, Arsenio Glasgow. The work documented in this note was completed, reviewed, and approved by the attending psychiatrist as designated Dr. Sonny Diaz electronic signature.     I, Sonny Diaz MD, personally performed the services described in this documentation as scribed by  the above named individual and is both accurate and complete as of 7/14/2024.

## 2024-07-14 NOTE — PLAN OF CARE
Goal Outcome Evaluation:  Plan of Care Reviewed With: patient  Patient Agreement with Plan of Care: agrees     Progress: improving  Outcome Evaluation: Pt reports good appetite and sleep and denies new sx, tremor noted, and slightly unsteady but still currently aware of abilities. Pt rates anxiety 5/10 and denies craving and depression. Pt denies SI/HI/AVH.    Pt had difficulty sleep during the night and was intermittently disoriented, but currently is redirectable. Pt possibly slept approximately 3 hours this shift.

## 2024-07-15 PROCEDURE — 99232 SBSQ HOSP IP/OBS MODERATE 35: CPT | Performed by: PSYCHIATRY & NEUROLOGY

## 2024-07-15 RX ORDER — LORAZEPAM 2 MG/1
2 TABLET ORAL
Status: DISCONTINUED | OUTPATIENT
Start: 2024-07-15 | End: 2024-07-18 | Stop reason: HOSPADM

## 2024-07-15 RX ADMIN — SUCRALFATE 1 G: 1 TABLET ORAL at 08:07

## 2024-07-15 RX ADMIN — LORAZEPAM 2 MG: 2 TABLET ORAL at 01:06

## 2024-07-15 RX ADMIN — LORAZEPAM 1 MG: 1 TABLET ORAL at 21:17

## 2024-07-15 RX ADMIN — LORAZEPAM 2 MG: 2 TABLET ORAL at 12:09

## 2024-07-15 RX ADMIN — Medication 1 PATCH: at 08:07

## 2024-07-15 RX ADMIN — LORAZEPAM 2 MG: 2 TABLET ORAL at 03:04

## 2024-07-15 RX ADMIN — IBUPROFEN 400 MG: 400 TABLET, FILM COATED ORAL at 08:07

## 2024-07-15 RX ADMIN — LORAZEPAM 1 MG: 1 TABLET ORAL at 08:07

## 2024-07-15 RX ADMIN — FUROSEMIDE 40 MG: 20 TABLET ORAL at 08:07

## 2024-07-15 RX ADMIN — LORAZEPAM 2 MG: 2 TABLET ORAL at 06:03

## 2024-07-15 RX ADMIN — HYDROXYZINE HYDROCHLORIDE 50 MG: 50 TABLET ORAL at 08:07

## 2024-07-15 RX ADMIN — Medication 1 TABLET: at 08:07

## 2024-07-15 RX ADMIN — SUCRALFATE 1 G: 1 TABLET ORAL at 14:50

## 2024-07-15 RX ADMIN — LORAZEPAM 1 MG: 1 TABLET ORAL at 14:50

## 2024-07-15 RX ADMIN — LORAZEPAM 2 MG: 2 TABLET ORAL at 02:36

## 2024-07-15 RX ADMIN — LORAZEPAM 2 MG: 2 TABLET ORAL at 03:35

## 2024-07-15 RX ADMIN — HYDROXYZINE HYDROCHLORIDE 50 MG: 50 TABLET ORAL at 14:50

## 2024-07-15 RX ADMIN — SUCRALFATE 1 G: 1 TABLET ORAL at 21:17

## 2024-07-15 RX ADMIN — SACUBITRIL AND VALSARTAN 1 TABLET: 24; 26 TABLET, FILM COATED ORAL at 08:10

## 2024-07-15 RX ADMIN — LORAZEPAM 2 MG: 2 TABLET ORAL at 09:48

## 2024-07-15 RX ADMIN — Medication 2 TABLET: at 08:07

## 2024-07-15 RX ADMIN — HYDROXYZINE HYDROCHLORIDE 50 MG: 50 TABLET ORAL at 21:17

## 2024-07-15 RX ADMIN — EMPAGLIFLOZIN 25 MG: 25 TABLET, FILM COATED ORAL at 08:10

## 2024-07-15 RX ADMIN — METOPROLOL SUCCINATE 50 MG: 50 TABLET, EXTENDED RELEASE ORAL at 08:07

## 2024-07-15 RX ADMIN — LORAZEPAM 2 MG: 2 TABLET ORAL at 06:41

## 2024-07-15 RX ADMIN — LORAZEPAM 2 MG: 2 TABLET ORAL at 11:26

## 2024-07-15 RX ADMIN — LORAZEPAM 2 MG: 2 TABLET ORAL at 09:13

## 2024-07-15 RX ADMIN — SACUBITRIL AND VALSARTAN 1 TABLET: 24; 26 TABLET, FILM COATED ORAL at 21:17

## 2024-07-15 RX ADMIN — MONTELUKAST SODIUM 10 MG: 10 TABLET, COATED ORAL at 21:17

## 2024-07-15 RX ADMIN — LORAZEPAM 2 MG: 2 TABLET ORAL at 02:02

## 2024-07-15 RX ADMIN — Medication 100 MG: at 08:07

## 2024-07-15 RX ADMIN — TRAZODONE HYDROCHLORIDE 50 MG: 50 TABLET ORAL at 21:18

## 2024-07-15 RX ADMIN — LORAZEPAM 2 MG: 2 TABLET ORAL at 01:35

## 2024-07-15 NOTE — PLAN OF CARE
Goal Outcome Evaluation:  Plan of Care Reviewed With: patient  Patient Agreement with Plan of Care: unable to participate     Progress: declining  Outcome Evaluation: Pt still somewhat redirectable, but confused and disoriented to situation, place, date/time. Pt required many PRN doses of Ativan this shift. Pt reports good appetite but has not slept hardly at all in the last 36 hours at least. Pt denies SI/HI/AVH. Pt also seen by staff responding to internal stimuli.

## 2024-07-15 NOTE — NURSING NOTE
Pt continues to be confused and requiring constant redirection. Contacted Dr. Fine, new orders for private room and to continue to give PRN Ativan for sx.

## 2024-07-15 NOTE — PROGRESS NOTES
INPATIENT PSYCHIATRIC PROGRESS NOTE    Name:  Raudel Harden  :  1960  MRN:  [id number]  Visit Number:  [id number]  Length of stay:  3    SUBJECTIVE    CC/Focus of Exam: alcohol withdrawals    INTERVAL HISTORY:  The patient has been confused and appears to be in delirium tremens with visual hallucinations and reaching to grab things that are not there. Has needed multiple doses of Ativan.     Review of Systems  Patient is confused  OBJECTIVE    Temp:  [97.2 °F (36.2 °C)-98.6 °F (37 °C)] 98.6 °F (37 °C)  Heart Rate:  [] 94  Resp:  [16-18] 16  BP: (111-148)/() 120/74    MENTAL STATUS EXAM:  Appearance:Casually dressed, good hygeine.   Cooperation:Confused  Psychomotor: Restless  Speech-Minimal  Affect-congruent, appropriate, stable  Thought Content-delusional material present  Thought process-disorganized.  Suicidality: No SI  Homicidality: No HI  Perception:+ AH/VH  Insight-impaired   Judgement-impaired    Lab Results (last 24 hours)       ** No results found for the last 24 hours. **               Imaging Results (Last 24 Hours)       ** No results found for the last 24 hours. **               ECG/EMG Results (most recent)       Procedure Component Value Units Date/Time    ECG 12 Lead Other; Baseline Cardiac Status [[id number]] Collected: 24 0206     Updated: 24 1020     QT Interval 378 ms      QTC Interval 504 ms     Narrative:      Test Reason : Other~  Blood Pressure :   */*   mmHG  Vent. Rate : 107 BPM     Atrial Rate : 107 BPM     P-R Int : 130 ms          QRS Dur :  78 ms      QT Int : 378 ms       P-R-T Axes :  36   3  36 degrees     QTc Int : 504 ms    Sinus tachycardia with premature atrial complexes with aberrant conduction  Cannot rule out Anterior infarct , age undetermined  Abnormal ECG  No previous ECGs available  Confirmed by Natty Polanco () on 2024 10:19:03 AM    Referred By:            Confirmed By: Natty Polanco    ECG 12 Lead QT Measurement [[id number]]  Collected: 07/13/24 1437     Updated: 07/13/24 1939     QT Interval 448 ms      QTC Interval 516 ms     Narrative:      Test Reason : QT Measurement  Blood Pressure :   */*   mmHG  Vent. Rate :  80 BPM     Atrial Rate :  80 BPM     P-R Int : 134 ms          QRS Dur :  76 ms      QT Int : 448 ms       P-R-T Axes :  58  29  50 degrees     QTc Int : 516 ms    Normal sinus rhythm  Prolonged QT  Abnormal ECG  When compared with ECG of 13-JUL-2024 14:37, (Unconfirmed)  No significant change was found  Confirmed by Maximo Pike ([id number]) on 7/13/2024 7:38:18 PM    Referred By: MARLA           Confirmed By: Maximo Pike    ECG 12 Lead QT Measurement [[id number]] Collected: 07/14/24 0710     Updated: 07/14/24 1958     QT Interval 400 ms      QTC Interval 497 ms     Narrative:      Test Reason : QT Measurement  Blood Pressure :   */*   mmHG  Vent. Rate :  93 BPM     Atrial Rate :  93 BPM     P-R Int : 140 ms          QRS Dur :  74 ms      QT Int : 400 ms       P-R-T Axes :  65  61  68 degrees     QTc Int : 497 ms    Normal sinus rhythm  Nonspecific ST abnormality  Prolonged QT  Abnormal ECG  When compared with ECG of 13-JUL-2024 14:37,  No significant change was found  Confirmed by Maximo Pike ([id number]) on 7/14/2024 7:57:53 PM    Referred By: DAYANA           Confirmed By: Maximo Pike             ALLERGIES: Bupropion    Medication Review:   Scheduled Medications:  B-complex with vitamin C, 2 tablet, Oral, Daily  empagliflozin, 25 mg, Oral, Daily  furosemide, 40 mg, Oral, Daily  LORazepam, 1 mg, Oral, 3 times per day   Followed by  [START ON 7/16/2024] LORazepam, 0.5 mg, Oral, 3 times per day  metoprolol succinate XL, 50 mg, Oral, Daily  montelukast, 10 mg, Oral, Nightly  multivitamin with minerals, 1 tablet, Oral, Daily  nicotine, 1 patch, Transdermal, Q24H  sacubitril-valsartan, 1 tablet, Oral, BID  sucralfate, 1 g, Oral, TID  thiamine, 100 mg, Oral, Daily         PRN Medications:    acetaminophen     aluminum-magnesium hydroxide-simethicone    benzonatate    benztropine **OR** benztropine    famotidine    hydrOXYzine    ibuprofen    loperamide    [] LORazepam **FOLLOWED BY** [] LORazepam **FOLLOWED BY** LORazepam **FOLLOWED BY** [START ON 2024] LORazepam    LORazepam    magnesium hydroxide    ondansetron ODT    polyethylene glycol    sodium chloride    traZODone   All medications reviewed.    ASSESSMENT & PLAN:    Alcohol use disorder, severe, dependence  Alcohol withdrawal delirium  - Ativan detox with increased frequency of prn Ativan dosing.  - Thiamine and folate     Prolonged Qtc  - Patient asymptomatic   - Hold hydroxyzine, ondansetron and trazodone  - Continue to monitor      Skin wounds  - Patient has multiple lesions on scalp, neck, left antecubital, left upper sternal, upper thoracic spine  - Wound care consult and appreciate recommendations     Nicotine use disorder  - Encourage cessation       COPD  - Oxygen per nasal canula as needed.    Special precautions: Special Precautions Level 4 (q30 min checks).    Behavioral Health Treatment Plan and Problem List: I have reviewed and approved the Behavioral Health Treatment Plan and Problem list.  The patient has had a chance to review and agrees with the treatment plan.    Copied text in portions of this note has been reviewed and is accurate as of 07/15/24         Clinician:  Clint He MD  07/15/24  12:45 EDT

## 2024-07-15 NOTE — PLAN OF CARE
Goal Outcome Evaluation:        Problem: Adult Behavioral Health Plan of Care  Goal: Patient-Specific Goal (Individualization)  Outcome: Ongoing, Progressing  Flowsheets  Taken 7/12/2024 1028  Patient-Specific Goals (Include Timeframe): Identify 2-3 coping skills, address relapse prevention methods, complete aftercare plans, and deny SI/HI prior to discharge.  Individualized Care Needs: Therapist to offer 1-4 therapy sessions, aftercare planning, safety planning, family education, group therapy, and brief CBT/MI interventions.  Anxieties, Fears or Concerns: none verbalized  Taken 7/12/2024 1014  Patient Personal Strengths:   resilient   resourceful   motivated for treatment   motivated for recovery   spiritual/Spiritism support   stable living environment   family/social support   socioeconomic stability  Patient Vulnerabilities:   substance abuse/addiction   history of unsuccessful treatment   poor physical health   poor impulse control   lacks insight into illness  Goal: Optimized Coping Skills in Response to Life Stressors  Outcome: Ongoing, Progressing  Flowsheets (Taken 7/12/2024 1028)  Optimized Coping Skills in Response to Life Stressors: making progress toward outcome  Intervention: Promote Effective Coping Strategies  Flowsheets (Taken 7/15/2024 0949)  Supportive Measures:   active listening utilized   counseling provided   decision-making supported   goal-setting facilitated   verbalization of feelings encouraged   self-responsibility promoted   positive reinforcement provided   self-reflection promoted   self-care encouraged  Goal: Develops/Participates in Therapeutic [place] to Support Successful Transition  Outcome: Ongoing, Progressing  Flowsheets (Taken 7/12/2024 1028)  Develops/Participates in Therapeutic [place] to Support Successful Transition: making progress toward outcome  Intervention: Foster Therapeutic [place]  Flowsheets (Taken 7/15/2024 0949)  Trust Relationship/Rapport:   care  explained   reassurance provided   choices provided   thoughts/feelings acknowledged   emotional support provided   empathic listening provided   questions answered   questions encouraged  Intervention: Mutually Develop Transition Plan  Flowsheets  Taken 7/15/2024 0962  Discharge Coordination/Progress: Patient is Medicaid pending. Therapist will continue to work with patient to establish safe disposition plan and aftercare as mental status improves.  Transportation Anticipated:   agency   family or friend will provide   public transportation  Transportation Concerns: none  Current Discharge Risk: substance use/abuse  Concerns to be Addressed:   substance/tobacco abuse/use   coping/stress   cognitive/perceptual   mental health   discharge planning  Readmission Within the Last 30 Days: no previous admission in last 30 days  Patient/Family Anticipated Services at Transition:      rehabilitation services   mental health services   outpatient care  Patient's Choice of Community Agency(s): To be determined  Patient/Family Anticipates Transition to: inpatient rehabilitation facility  Offered/Gave Vendor List: yes  Taken 7/12/2024 1028  Outpatient/Agency/Support Group Needs:   residential services   outpatient substance abuse treatment (specify)   outpatient psychiatric care (specify)   intensive outpatient services   outpatient medication management   outpatient counseling  Transition Support:   follow-up care discussed   follow-up care coordinated   community resources reviewed   crisis management plan promoted   crisis management plan verbalized  Anticipated Discharge Disposition:   residential substance use unit   home or self-care         DATA:  Therapist met with Patient individually this date. Patient agreeable to discuss current treatment progress and discharge concerns.     CLINICAL MANUVERING/INTERVENTIONS:    Assisted Patient in processing session content; acknowledged and normalized Patient’s  thoughts, feelings, and concerns by utilizing a person-centered approach in efforts to build appropriate rapport and a positive therapeutic relationship with open and honest communication. Allowed Patient to ventilate regarding current stressors and triggers for negative emotions and thoughts in a safe nonjudgmental environment with unconditional positive regard, active listening skills, and empathy. Therapist implemented motivational interviewing techniques to assist Patient with exploring personal growth and change and discussed distress tolerance skills, self soothing techniques, and applied cognitive behavioral strategies to facilitate identification of maladaptive patterns of thinking and behavior.Therapist utilized dialectical behavior techniques to teach and model emotional regulation and relaxation methods. Therapist assisted Patient with identifying and implementing healthier coping strategies. Therapist assisted Patient with safety planning; Patient agreed to continue honest communication with Treatment Team while inpatient and identify any SI/HI. Patient encouraged to seek nearest ER or contact 911 if danger to self or others post discharge.     ASSESSMENT:  Patient continues to receive treatment for alcohol detox. Patient has been confused and experiencing visual hallucinations, unable to participate at this time. Therapist will continue to assess patient and work to establish safe disposition plan and aftercare as his mental status improves.     PLAN:   Patient will continue stabilization. Patient will continue to receive services offered by Treatment Team.     Therapist recommends [facility] residential rehab.

## 2024-07-15 NOTE — PLAN OF CARE
Goal Outcome Evaluation:  Plan of Care Reviewed With: patient  Patient Agreement with Plan of Care: agrees     Progress: no change  Outcome Evaluation: Pt asleep in room with sitter for most of shift. Still confused upon awakening, and picking at objects to visible. Pt responsive to questions, but not always appropriately. Disoriented to place and time. Denies SI/HI. No distress noted.

## 2024-07-15 NOTE — NURSING NOTE
Contacted Dr. Fine re: /85 P121 O296 CIWA 19; new orders to increase Ativan 2mg to Q30 minutes TORBV.

## 2024-07-16 PROCEDURE — 99232 SBSQ HOSP IP/OBS MODERATE 35: CPT | Performed by: PSYCHIATRY & NEUROLOGY

## 2024-07-16 RX ADMIN — Medication 2 TABLET: at 08:40

## 2024-07-16 RX ADMIN — SACUBITRIL AND VALSARTAN 1 TABLET: 24; 26 TABLET, FILM COATED ORAL at 08:40

## 2024-07-16 RX ADMIN — HYDROXYZINE HYDROCHLORIDE 50 MG: 50 TABLET ORAL at 20:27

## 2024-07-16 RX ADMIN — LORAZEPAM 0.5 MG: 0.5 TABLET ORAL at 08:39

## 2024-07-16 RX ADMIN — EMPAGLIFLOZIN 25 MG: 25 TABLET, FILM COATED ORAL at 08:39

## 2024-07-16 RX ADMIN — METOPROLOL SUCCINATE 50 MG: 50 TABLET, EXTENDED RELEASE ORAL at 08:40

## 2024-07-16 RX ADMIN — Medication 100 MG: at 08:39

## 2024-07-16 RX ADMIN — TRAZODONE HYDROCHLORIDE 50 MG: 50 TABLET ORAL at 21:16

## 2024-07-16 RX ADMIN — IBUPROFEN 400 MG: 400 TABLET, FILM COATED ORAL at 08:39

## 2024-07-16 RX ADMIN — LORAZEPAM 0.5 MG: 0.5 TABLET ORAL at 21:16

## 2024-07-16 RX ADMIN — MONTELUKAST SODIUM 10 MG: 10 TABLET, COATED ORAL at 21:15

## 2024-07-16 RX ADMIN — FUROSEMIDE 40 MG: 20 TABLET ORAL at 08:40

## 2024-07-16 RX ADMIN — HYDROXYZINE HYDROCHLORIDE 50 MG: 50 TABLET ORAL at 08:40

## 2024-07-16 RX ADMIN — SUCRALFATE 1 G: 1 TABLET ORAL at 15:10

## 2024-07-16 RX ADMIN — SACUBITRIL AND VALSARTAN 1 TABLET: 24; 26 TABLET, FILM COATED ORAL at 21:16

## 2024-07-16 RX ADMIN — Medication 1 TABLET: at 08:39

## 2024-07-16 RX ADMIN — LORAZEPAM 0.5 MG: 0.5 TABLET ORAL at 15:09

## 2024-07-16 RX ADMIN — SUCRALFATE 1 G: 1 TABLET ORAL at 21:16

## 2024-07-16 RX ADMIN — SUCRALFATE 1 G: 1 TABLET ORAL at 08:40

## 2024-07-16 NOTE — PROGRESS NOTES
"INPATIENT PSYCHIATRIC PROGRESS NOTE    Name:  Raudel Harden  :  1960  MRN:  [id number]  Visit Number:  [id number]  Length of stay:  4    SUBJECTIVE    CC/Focus of Exam: alcohol withdrawals    INTERVAL HISTORY:  The patient is more alert and oriented today and states he is feeling better. He reports no active withdrawals. States he is in [facility] for mostly alcohol abuse.   Depression rating 0/10  Anxiety rating 5/10  Sleep:better  Withdrawal sx: Mild  Cravin/10    Review of Systems   Constitutional: Negative.    Respiratory: Negative.     Cardiovascular: Negative.    Gastrointestinal: Negative.    Psychiatric/Behavioral:  The patient is nervous/anxious.        OBJECTIVE    Temp:  [97.1 °F (36.2 °C)-98.1 °F (36.7 °C)] 97.5 °F (36.4 °C)  Heart Rate:  [] 84  Resp:  [16-18] 16  BP: (114-136)/(65-93) 127/82    MENTAL STATUS EXAM:  Appearance:Casually dressed, good hygeine.   Cooperation:Cooperative  Psychomotor: No psychomotor agitation/retardation, No EPS, No motor tics  Speech-normal rate, amount.  Mood \"better\"   Affect-congruent, appropriate, stable  Thought Content-goal directed, no delusional material present  Thought process-linear, organized.  Suicidality: No SI  Homicidality: No HI  Perception: No AH/VH  Insight-fair   Judgement-fair    Lab Results (last 24 hours)       ** No results found for the last 24 hours. **               Imaging Results (Last 24 Hours)       ** No results found for the last 24 hours. **               ECG/EMG Results (most recent)       Procedure Component Value Units Date/Time    ECG 12 Lead Other; Baseline Cardiac Status [[id number]] Collected: 24 0206     Updated: 24 1020     QT Interval 378 ms      QTC Interval 504 ms     Narrative:      Test Reason : Other~  Blood Pressure :   */*   mmHG  Vent. Rate : 107 BPM     Atrial Rate : 107 BPM     P-R Int : 130 ms          QRS Dur :  78 ms      QT Int : 378 ms       P-R-T Axes :  36   3  36 degrees     " QTc Int : 504 ms    Sinus tachycardia with premature atrial complexes with aberrant conduction  Cannot rule out Anterior infarct , age undetermined  Abnormal ECG  No previous ECGs available  Confirmed by Natty Polanco ([id number]) on 7/12/2024 10:19:03 AM    Referred By:            Confirmed By: Natty Polanco    ECG 12 Lead QT Measurement [[id number]] Collected: 07/13/24 1437     Updated: 07/13/24 1939     QT Interval 448 ms      QTC Interval 516 ms     Narrative:      Test Reason : QT Measurement  Blood Pressure :   */*   mmHG  Vent. Rate :  80 BPM     Atrial Rate :  80 BPM     P-R Int : 134 ms          QRS Dur :  76 ms      QT Int : 448 ms       P-R-T Axes :  58  29  50 degrees     QTc Int : 516 ms    Normal sinus rhythm  Prolonged QT  Abnormal ECG  When compared with ECG of 13-JUL-2024 14:37, (Unconfirmed)  No significant change was found  Confirmed by Maximo Pike ([id number]) on 7/13/2024 7:38:18 PM    Referred By: MARLA           Confirmed By: Maximo Pike    ECG 12 Lead QT Measurement [[id number]] Collected: 07/14/24 0710     Updated: 07/14/24 1958     QT Interval 400 ms      QTC Interval 497 ms     Narrative:      Test Reason : QT Measurement  Blood Pressure :   */*   mmHG  Vent. Rate :  93 BPM     Atrial Rate :  93 BPM     P-R Int : 140 ms          QRS Dur :  74 ms      QT Int : 400 ms       P-R-T Axes :  65  61  68 degrees     QTc Int : 497 ms    Normal sinus rhythm  Nonspecific ST abnormality  Prolonged QT  Abnormal ECG  When compared with ECG of 13-JUL-2024 14:37,  No significant change was found  Confirmed by Maximo Pike ([id number]) on 7/14/2024 7:57:53 PM    Referred By: DAYANA           Confirmed By: Maximo Pike             ALLERGIES: Bupropion    Medication Review:   Scheduled Medications:  B-complex with vitamin C, 2 tablet, Oral, Daily  empagliflozin, 25 mg, Oral, Daily  furosemide, 40 mg, Oral, Daily  LORazepam, 0.5 mg, Oral, 3 times per day  metoprolol succinate XL, 50 mg, Oral,  Daily  montelukast, 10 mg, Oral, Nightly  multivitamin with minerals, 1 tablet, Oral, Daily  nicotine, 1 patch, Transdermal, Q24H  sacubitril-valsartan, 1 tablet, Oral, BID  sucralfate, 1 g, Oral, TID  thiamine, 100 mg, Oral, Daily         PRN Medications:    acetaminophen    aluminum-magnesium hydroxide-simethicone    benzonatate    benztropine **OR** benztropine    famotidine    hydrOXYzine    ibuprofen    loperamide    [] LORazepam **FOLLOWED BY** [] LORazepam **FOLLOWED BY** [] LORazepam **FOLLOWED BY** LORazepam    LORazepam    magnesium hydroxide    ondansetron ODT    polyethylene glycol    sodium chloride    traZODone   All medications reviewed.    ASSESSMENT & PLAN:    Alcohol use disorder, severe, dependence  Alcohol withdrawal delirium resolved  - Ativan detox   - Thiamine and folate      Prolonged Qtc  - Patient asymptomatic   - Hold hydroxyzine, ondansetron and trazodone  - Continue to monitor      Skin wounds  - Patient has multiple lesions on scalp, neck, left antecubital, left upper sternal, upper thoracic spine  - Wound care consult and appreciate recommendations     Nicotine use disorder  - Encourage cessation       COPD  - Oxygen per nasal canula as needed.    Patient appears to be doing a lot better. Will discontinue the sitter.     Special precautions: Special Precautions Level 4 (q30 min checks).    Behavioral Health Treatment Plan and Problem List: I have reviewed and approved the Behavioral Health Treatment Plan and Problem list.  The patient has had a chance to review and agrees with the treatment plan.    Copied text in portions of this note has been reviewed and is accurate as of 24         Clinician:  Clint He MD  24  13:56 EDT

## 2024-07-16 NOTE — PLAN OF CARE
Goal Outcome Evaluation:  Plan of Care Reviewed With: patient  Patient Agreement with Plan of Care: agrees     Progress: improving  Outcome Evaluation: Pt calm and cooperative this shift. Cognitive state greatly improved from previous day. Pt A&Ox4 with steady gait. C/O only mild weakness. Sitter d/c this shift. Denies SI/HI/AVH. No distress noted.

## 2024-07-16 NOTE — PLAN OF CARE
Goal Outcome Evaluation:        Problem: Adult Behavioral Health Plan of Care  Goal: Patient-Specific Goal (Individualization)  Outcome: Ongoing, Progressing  Flowsheets  Taken 7/12/2024 1028  Patient-Specific Goals (Include Timeframe): Identify 2-3 coping skills, address relapse prevention methods, complete aftercare plans, and deny SI/HI prior to discharge.  Individualized Care Needs: Therapist to offer 1-4 therapy sessions, aftercare planning, safety planning, family education, group therapy, and brief CBT/MI interventions.  Anxieties, Fears or Concerns: none verbalized  Taken 7/12/2024 1014  Patient Personal Strengths:   resilient   resourceful   motivated for treatment   motivated for recovery   spiritual/[employer] support   stable living environment   family/social support   socioeconomic stability  Patient Vulnerabilities:   substance abuse/addiction   history of unsuccessful treatment   poor physical health   poor impulse control   lacks insight into illness  Goal: Optimized Coping Skills in Response to Life Stressors  Outcome: Ongoing, Progressing  Flowsheets (Taken 7/12/2024 1028)  Optimized Coping Skills in Response to Life Stressors: making progress toward outcome  Intervention: Promote Effective Coping Strategies  Flowsheets (Taken 7/16/2024 0946)  Supportive Measures:   active listening utilized   counseling provided   decision-making supported   goal-setting facilitated   verbalization of feelings encouraged   self-responsibility promoted   self-reflection promoted   positive reinforcement provided   self-care encouraged  Goal: Develops/Participates in Therapeutic [place] to Support Successful Transition  Outcome: Ongoing, Progressing  Flowsheets (Taken 7/12/2024 1028)  Develops/Participates in Therapeutic [place] to Support Successful Transition: making progress toward outcome  Intervention: Foster Therapeutic [place]  Flowsheets (Taken 7/16/2024 0946)  Trust Relationship/Rapport:   care  explained   questions encouraged   choices provided   reassurance provided   emotional support provided   thoughts/feelings acknowledged   empathic listening provided   questions answered  Intervention: Mutually Develop Transition Plan  Flowsheets  Taken 7/16/2024 0945  Transportation Anticipated:   agency   family or friend will provide   public transportation  Transportation Concerns: none  Current Discharge Risk: substance use/abuse  Readmission Within the Last 30 Days: no previous admission in last 30 days  Patient/Family Anticipated Services at Transition:   rehabilitation services   mental health services   outpatient care  Patient's Choice of Community Agency(s): To be determined  Patient/Family Anticipates Transition to:   inpatient rehabilitation facility   home with family  Offered/Gave Vendor List: yes  Taken 7/15/2024 0948  Discharge Coordination/Progress: Patient is Medicaid pending. Therapist will continue to work with patient to establish safe disposition plan and aftercare as mental status improves.  Concerns to be Addressed:   substance/tobacco abuse/use   coping/stress   cognitive/perceptual   mental health   discharge planning  Taken 7/12/2024 1028  Outpatient/Agency/Support Group Needs:   residential services   outpatient substance abuse treatment (specify)   outpatient psychiatric care (specify)   intensive outpatient services   outpatient medication management   outpatient counseling  Transition Support:   follow-up care discussed   follow-up care coordinated   community resources reviewed   crisis management plan promoted   crisis management plan verbalized  Anticipated Discharge Disposition:   residential substance use unit   home or self-care      DATA:  Therapist met with Patient individually this date. Patient agreeable to discuss current treatment progress and discharge concerns.     CLINICAL MANUVERING/INTERVENTIONS:    Assisted Patient in processing session content; acknowledged and  normalized Patient’s thoughts, feelings, and concerns by utilizing a person-centered approach in efforts to build appropriate rapport and a positive therapeutic relationship with open and honest communication. Allowed Patient to ventilate regarding current stressors and triggers for negative emotions and thoughts in a safe nonjudgmental environment with unconditional positive regard, active listening skills, and empathy. Therapist implemented motivational interviewing techniques to assist Patient with exploring personal growth and change and discussed distress tolerance skills, self soothing techniques, and applied cognitive behavioral strategies to facilitate identification of maladaptive patterns of thinking and behavior.Therapist utilized dialectical behavior techniques to teach and model emotional regulation and relaxation methods. Therapist assisted Patient with identifying and implementing healthier coping strategies. Therapist assisted Patient with safety planning; Patient agreed to continue honest communication with Treatment Team while inpatient and identify any SI/HI. Patient encouraged to seek nearest ER or contact 911 if danger to self or others post discharge.     ASSESSMENT:  Therapist met with patient on this date, he continues to receive treatment for alcohol detox. Patient is not very talkative but reports he is feeling quite a bit better, states this has been easier than he thought it was going to be. He denies feeling depressed, reports moderate anxiety. Denies SI/HI/AVH. Patient reports only mild withdrawal symptoms and mental status has improved. Therapist reviewed after care options and levels of care with patient. He is still unsure of what he wants to do but thinks he will return home first either way, even if he decides to go to rehab, as he would like to talk things over with his wife. Therapist provided resource list of residential facilities and encouraged patient to call his wife at  phone time this evening. Outpatient services have also been offered. Patient has not agreed to aftercare at this time. Patient expects family to provide transportation home at discharge. Reviewed healthy coping skills and relapse prevention. Patient denies having nay additional needs or concerns.     PLAN:   Patient will continue stabilization. Patient will continue to receive services offered by Treatment Team.     Therapist recommends [facility] residential rehab. Patient plans to return home at discharge and has not been agreeable to aftercare.

## 2024-07-16 NOTE — PLAN OF CARE
Goal Outcome Evaluation:  Plan of Care Reviewed With: patient  Patient Agreement with Plan of Care: agrees     Progress: improving  Outcome Evaluation: Pt unable to participate in the majority of assessment due to cognitive status, but pt has rested much more today than previous dates.    Pt slept the majority of the night, waking briefly intermittently but w/o complaint. Pt slept approximately 10 hours this shift.

## 2024-07-17 PROCEDURE — 99232 SBSQ HOSP IP/OBS MODERATE 35: CPT | Performed by: PSYCHIATRY & NEUROLOGY

## 2024-07-17 PROCEDURE — 97161 PT EVAL LOW COMPLEX 20 MIN: CPT

## 2024-07-17 RX ADMIN — TRAZODONE HYDROCHLORIDE 50 MG: 50 TABLET ORAL at 21:08

## 2024-07-17 RX ADMIN — SUCRALFATE 1 G: 1 TABLET ORAL at 15:26

## 2024-07-17 RX ADMIN — Medication 2 TABLET: at 08:31

## 2024-07-17 RX ADMIN — Medication 1 TABLET: at 08:31

## 2024-07-17 RX ADMIN — HYDROXYZINE HYDROCHLORIDE 50 MG: 50 TABLET ORAL at 15:27

## 2024-07-17 RX ADMIN — SUCRALFATE 1 G: 1 TABLET ORAL at 08:31

## 2024-07-17 RX ADMIN — MONTELUKAST SODIUM 10 MG: 10 TABLET, COATED ORAL at 21:08

## 2024-07-17 RX ADMIN — SACUBITRIL AND VALSARTAN 1 TABLET: 24; 26 TABLET, FILM COATED ORAL at 08:31

## 2024-07-17 RX ADMIN — FUROSEMIDE 40 MG: 20 TABLET ORAL at 08:31

## 2024-07-17 RX ADMIN — SUCRALFATE 1 G: 1 TABLET ORAL at 21:08

## 2024-07-17 RX ADMIN — HYDROXYZINE HYDROCHLORIDE 50 MG: 50 TABLET ORAL at 08:31

## 2024-07-17 RX ADMIN — SACUBITRIL AND VALSARTAN 1 TABLET: 24; 26 TABLET, FILM COATED ORAL at 21:08

## 2024-07-17 RX ADMIN — METOPROLOL SUCCINATE 50 MG: 50 TABLET, EXTENDED RELEASE ORAL at 08:31

## 2024-07-17 RX ADMIN — EMPAGLIFLOZIN 25 MG: 25 TABLET, FILM COATED ORAL at 08:31

## 2024-07-17 RX ADMIN — Medication 100 MG: at 08:31

## 2024-07-17 NOTE — PLAN OF CARE
Goal Outcome Evaluation:              Outcome Evaluation: Pt seen for evaluation this date, pleasant and cooperative with therapy. Pt demonstrates baseline mobility, may benefit from HEP for DF strengthening. Thank you for the consult, PT signing off.      Anticipated Discharge Disposition (PT): home with supervision, home

## 2024-07-17 NOTE — THERAPY DISCHARGE NOTE
Acute Care - Physical Therapy Initial Evaluation/Discharge   [facility]     Patient Name: Raudel Harden  : 1960  MRN: [id number]  Today's Date: 2024   Onset of Illness/Injury or Date of Surgery: 24 (admission date)            Admit Date: 2024    Visit Dx:  No diagnosis found.  Patient Active Problem List   Diagnosis    Alcohol use disorder, severe, dependence    Alcohol withdrawal     Past Medical History:   Diagnosis Date    Alcohol abuse     Cardiovascular arteriosclerosis     GI bleed      History reviewed. No pertinent surgical history.    PT Assessment (Last 12 Hours)       PT Evaluation and Treatment       Row Name 24 1315          Physical Therapy Time and Intention    Document Type evaluation;discharge evaluation/summary  -     Mode of Treatment physical therapy  -     Patient Effort excellent  -     Comment Pt seen for PT evaluation this date, pleasant and cooperative with therapy. Pt states he lives at home with wife, grossly (I) PLOF without falls, rates balance as fair to good. Pt grossly (I) PLOF.  -       Row Name 24 1315          General Information    Patient Profile Reviewed yes  -     Onset of Illness/Injury or Date of Surgery 24  admission date  -     Patient Observations alert;cooperative;agree to therapy  -     General Observations of Patient Pt seen sitting in main room of detox unit, pleasant and cooperative with therapy.  -     Equipment Currently Used at Home none  has RW however d/t abdominal surgery in past  -       Row Name 24 1315          Living Environment    Current Living Arrangements home  from chart  -     People in Home spouse  -       Row Name 24 1315          Cognition    Personal Safety Interventions supervised activity  -       Row Name 24 1315          Strength Comprehensive (MMT)    Comment, General Manual Muscle Testing (MMT) Assessment Grossly 5/5 MMT with L DF 3/5  -       Row Name  07/17/24 1315          Bed Mobility    Comment, (Bed Mobility) Not observed as pt was in main room of detox unit  -       Row Name 07/17/24 1315          Transfers    Transfers sit-stand transfer;stand-sit transfer  -       Row Name 07/17/24 1315          Sit-Stand Transfer    Sit-Stand [place] (Transfers) independent  -       Row Name 07/17/24 1315          Stand-Sit Transfer    Stand-Sit [place] (Transfers) independent  -       Row Name 07/17/24 1315          Gait/Stairs (Locomotion)    Gait/Stairs Locomotion gait/ambulation independence  -     [place] Level (Gait) independent  -     Patient was able to Ambulate yes  -KH     Distance in Feet (Gait) 10  grossly, for evaluation assessment.  -       Row Name 07/17/24 1315          Balance    Comment, Balance Static standing balance grossly WFL  -[place]       Row Name             Wound 07/12/24 0147 Left antecubital    Wound - Properties Group Placement Date: 07/12/24  -CF Placement Time: 0147  -CF Side: Left  -CF Location: antecubital  -CF    Retired Wound - Properties Group Placement Date: 07/12/24  -CF Placement Time: 0147  -CF Side: Left  -CF Location: antecubital  -CF    Retired Wound - Properties Group Date first assessed: 07/12/24  -CF Time first assessed: 0147  -CF Side: Left  -CF Location: antecubital  -CF      Row Name             Wound 07/12/24 0149 upper thoracic spine    Wound - Properties Group Placement Date: 07/12/24  -CF Placement Time: 0149  -CF Orientation: upper  -CF Location: thoracic spine  -CF    Retired Wound - Properties Group Placement Date: 07/12/24  -CF Placement Time: 0149  -CF Orientation: upper  -CF Location: thoracic spine  -CF    Retired Wound - Properties Group Date first assessed: 07/12/24  -CF Time first assessed: 0149  -CF Location: thoracic spine  -CF      Row Name             Wound 07/12/24 0152 Left upper sternal    Wound - Properties Group Placement Date: 07/12/24  -CF Placement Time: 0152  -CF Side:  Left  -CF Orientation: upper  -CF Location: sternal  -CF    Retired Wound - Properties Group Placement Date: 07/12/24  -CF Placement Time: 0152  -CF Side: Left  -CF Orientation: upper  -CF Location: sternal  -CF    Retired Wound - Properties Group Date first assessed: 07/12/24  -CF Time first assessed: 0152  -CF Side: Left  -CF Location: sternal  -CF      Row Name             Wound 07/12/24 0153 scalp    Wound - Properties Group Placement Date: 07/12/24  -CF Placement Time: 0153  -CF Location: scalp  -CF    Retired Wound - Properties Group Placement Date: 07/12/24  -CF Placement Time: 0153  -CF Location: scalp  -CF    Retired Wound - Properties Group Date first assessed: 07/12/24  -CF Time first assessed: 0153  -CF Location: scalp  -CF      Row Name             Wound 07/12/24 0203 Right throat    Wound - Properties Group Placement Date: 07/12/24  -CF Placement Time: 0203  -CF Side: Right  -CF Location: throat  -CF    Retired Wound - Properties Group Placement Date: 07/12/24  -CF Placement Time: 0203  -CF Side: Right  -CF Location: throat  -CF    Retired Wound - Properties Group Date first assessed: 07/12/24  -CF Time first assessed: 0203  -CF Side: Right  -CF Location: throat  -CF      Row Name             Wound 07/13/24 0642 Left gluteal    Wound - Properties Group Placement Date: 07/13/24  -CF Placement Time: 0642  -CF Side: Left  -CF Location: gluteal  -CF    Retired Wound - Properties Group Placement Date: 07/13/24  -CF Placement Time: 0642  -CF Side: Left  -CF Location: gluteal  -CF    Retired Wound - Properties Group Date first assessed: 07/13/24  -CF Time first assessed: 0642  -CF Side: Left  -CF Location: gluteal  -CF      Row Name 07/17/24 1315          Plan of Care Review    Outcome Evaluation Pt seen for evaluation this date, pleasant and cooperative with therapy. Pt demonstrates baseline mobility, may benefit from HEP for DF strengthening. Thank you for the consult, PT signing off.  -KH       Row Name  07/17/24 1315          Positioning and Restraints    Pre-Treatment Position sitting in chair/recliner  -     Post Treatment Position other  -     Other Position --  standing in lobby within staff view  -       Row Name 07/17/24 1315          Therapy Assessment/Plan (PT)    Therapy Frequency (PT) evaluation only  -       Row Name 07/17/24 1315          Discharge Summary (PT)    Additional Documentation Discharge Summary (PT) (Group)  -       Row Name 07/17/24 1315          Discharge Summary (PT)    Outcomes Achieved/Progress Made Upon Discharge (PT) evaluation only  -     Transfer to Another Level of Care or Facility (PT) home;home with supervision recommended  -               User Key  (r) = Recorded By, (t) = Taken By, (c) = Cosigned By      Initials Name Provider Type    Gabriella Howard RN Registered Nurse    Samantha Phelps, PT Physical Therapist                          PT Recommendation and Plan  Anticipated Discharge Disposition (PT): home with supervision, home  Therapy Frequency (PT): evaluation only  Outcome Evaluation: Pt seen for evaluation this date, pleasant and cooperative with therapy. Pt demonstrates baseline mobility, may benefit from HEP for DF strengthening. Thank you for the consult, PT signing off.         Time Calculation:    PT Charges       Row Name 07/17/24 1532             Time Calculation    Start Time 1315  -KH      PT Received On 07/17/24  -                User Key  (r) = Recorded By, (t) = Taken By, (c) = Cosigned By      Initials Name Provider Type    Samantha Phelps, PT Physical Therapist                  Therapy Charges for Today       Code Description Service Date Service Provider Modifiers Qty    17730698680 HC PT EVAL LOW COMPLEXITY 4 7/17/2024 Samantha Landis, PT GP 1            PT G-Codes  AM-PAC 6 Clicks Score (PT): 24    PT Discharge Summary  Anticipated Discharge Disposition (PT): home with supervision, home    Samantha Landis  PT  7/17/2024

## 2024-07-17 NOTE — PLAN OF CARE
Goal Outcome Evaluation:  Plan of Care Reviewed With: patient  Patient Agreement with Plan of Care: agrees     Progress: improving  Outcome Evaluation: Pt calm and cooperative this shift. Pt more steady and oriented this shift. Pt denies SI/HI/AVH. Pt reports good appetite and sleep.  Pt appeared to sleep the majority of the night, approximately 7 hours this shift.

## 2024-07-17 NOTE — PLAN OF CARE
"Goal Outcome Evaluation:  Plan of Care Reviewed With: patient  Patient Agreement with Plan of Care: agrees     Progress: improving  Outcome Evaluation: Pt denies cravings. Rates depression and anxiety 1/10. Denies SI/HI/AVH. Denies issues with appetite. Reports sleep \"fair\" last night. Pt verbalizes anticipation for discharge and he is feeling much improved. States he is still undecided regarding a rehab, because he needs to talk with his wife about it.    Pt continues to improve this shift. Denies s/s withdrawal. Has been interactive with peers and staff most of shift.                          "

## 2024-07-17 NOTE — DISCHARGE INSTR - APPOINTMENTS
[facility]  [address] [id number] [phone]    You can walk-in on Friday, July 19 between 8:30am and 3:00pm for Intake.

## 2024-07-17 NOTE — PLAN OF CARE
Goal Outcome Evaluation:        Problem: Adult Behavioral Health Plan of Care  Goal: Patient-Specific Goal (Individualization)  Outcome: Ongoing, Progressing  Flowsheets  Taken 7/12/2024 1028  Patient-Specific Goals (Include Timeframe): Identify 2-3 coping skills, address relapse prevention methods, complete aftercare plans, and deny SI/HI prior to discharge.  Individualized Care Needs: Therapist to offer 1-4 therapy sessions, aftercare planning, safety planning, family education, group therapy, and brief CBT/MI interventions.  Anxieties, Fears or Concerns: none verbalized  Taken 7/12/2024 1014  Patient Personal Strengths:   resilient   resourceful   motivated for treatment   motivated for recovery   spiritual/Gnosticism support   stable living environment   family/social support   socioeconomic stability  Patient Vulnerabilities:   substance abuse/addiction   history of unsuccessful treatment   poor physical health   poor impulse control   lacks insight into illness  Goal: Optimized Coping Skills in Response to Life Stressors  Outcome: Ongoing, Progressing  Flowsheets (Taken 7/12/2024 1028)  Optimized Coping Skills in Response to Life Stressors: making progress toward outcome  Intervention: Promote Effective Coping Strategies  Flowsheets (Taken 7/17/2024 1349)  Supportive Measures:   active listening utilized   counseling provided   decision-making supported   goal-setting facilitated   verbalization of feelings encouraged   self-responsibility promoted   positive reinforcement provided   self-reflection promoted   self-care encouraged  Goal: Develops/Participates in Therapeutic [facility] to Support Successful Transition  Outcome: Ongoing, Progressing  Flowsheets (Taken 7/12/2024 1028)  Develops/Participates in Therapeutic [facility] to Support Successful Transition: making progress toward outcome  Intervention: Foster Therapeutic [facility]  Flowsheets (Taken 7/17/2024 0710 by April Higgins RN)  Trust  Relationship/Rapport:   care explained   choices provided   emotional support provided   empathic listening provided   questions answered   questions encouraged   reassurance provided   thoughts/feelings acknowledged  Intervention: Mutually Develop Transition Plan  Flowsheets  Taken 7/17/2024 1347  Discharge Coordination/Progress: Patient has [employer] Medicaid. Patient plans to return home at discharge and follow up outpatient with [facility] for IOP. Family to provide transportation.  Transportation Anticipated: family or friend will provide  Transportation Concerns: none  Current Discharge Risk: substance use/abuse  Concerns to be Addressed:   substance/tobacco abuse/use   coping/stress   cognitive/perceptual   mental health   discharge planning  Readmission Within the Last 30 Days: no previous admission in last 30 days  Patient/Family Anticipated Services at Transition:   outpatient care   mental health services  Patient's Choice of Community Agency(s): [facility]-IOP  Patient/Family Anticipates Transition to: home with family  Offered/Gave Vendor List: yes  Taken 7/12/2024 1028  Outpatient/Agency/Support Group Needs:   residential services   outpatient substance abuse treatment (specify)   outpatient psychiatric care (specify)   intensive outpatient services   outpatient medication management   outpatient counseling  Transition Support:   follow-up care discussed   follow-up care coordinated   community resources reviewed   crisis management plan promoted   crisis management plan verbalized  Anticipated Discharge Disposition:   residential substance use unit   home or self-care         DATA:  Therapist met with Patient individually this date. Patient agreeable to discuss current treatment progress and discharge concerns.     Patient signed consent for [facility].    CLINICAL MANUVERING/INTERVENTIONS:    Assisted Patient in processing session content; acknowledged and normalized Patient’s  thoughts, feelings, and concerns by utilizing a person-centered approach in efforts to build appropriate rapport and a positive therapeutic relationship with open and honest communication. Allowed Patient to ventilate regarding current stressors and triggers for negative emotions and thoughts in a safe nonjudgmental environment with unconditional positive regard, active listening skills, and empathy. Therapist implemented motivational interviewing techniques to assist Patient with exploring personal growth and change and discussed distress tolerance skills, self soothing techniques, and applied cognitive behavioral strategies to facilitate identification of maladaptive patterns of thinking and behavior.Therapist utilized dialectical behavior techniques to teach and model emotional regulation and relaxation methods. Therapist assisted Patient with identifying and implementing healthier coping strategies. Therapist assisted Patient with safety planning; Patient agreed to continue honest communication with Treatment Team while inpatient and identify any SI/HI. Patient encouraged to seek nearest ER or contact 911 if danger to self or others post discharge.     ASSESSMENT:  Therapist met with patient on this date. He continues to receive treatment for alcohol detox and expects to discharge home tomorrow. Patient denies feeling depressed or anxious, denies SI/HI/AVH. Patient reports only mild withdrawal symptoms. Discussed aftercare again with patient, he is interested in IOP and would prefer services in [place] since he lives in [place]. Patient will be scheduled for IOP with [facility] for aftercare. He was still not agreeable to residential treatment at this time, resource list has been provided. Reviewed healthy coping skills and relapse prevention.    PLAN:   Patient will continue stabilization. Patient will continue to receive services offered by Treatment Team.     Therapist recommends [facility] residential  rehab. Patient plans to return home at discharge and do IOP with [facility].

## 2024-07-17 NOTE — PROGRESS NOTES
"INPATIENT PSYCHIATRIC PROGRESS NOTE    Name:  Raudel Harden  :  1960  MRN:  [id number]  Visit Number:  [id number]  Length of stay:  5    SUBJECTIVE    CC/Focus of Exam: alcohol withdrawals    INTERVAL HISTORY:  The patient states he is feeling good today. He denies any active withdrawals. He is considering going to rehab but has to talk to his wife. He agreed to stay one more day and work on his after care plans and go to rehab directly from here tomorrow.   Depression rating 0/10  Anxiety rating 0/10  Sleep:better  Withdrawal sx: Mild  Cravin/10    Review of Systems   Constitutional: Negative.    Respiratory: Negative.     Cardiovascular: Negative.    Gastrointestinal: Negative.        OBJECTIVE    Temp:  [97.3 °F (36.3 °C)-98.4 °F (36.9 °C)] 97.5 °F (36.4 °C)  Heart Rate:  [78-88] 85  Resp:  [16-18] 18  BP: (111-156)/(71-90) 136/82    MENTAL STATUS EXAM:  Appearance:Casually dressed, good hygeine.   Cooperation:Cooperative  Psychomotor: No psychomotor agitation/retardation, No EPS, No motor tics  Speech-normal rate, amount.  Mood \"better\"   Affect-congruent, appropriate, stable  Thought Content-goal directed, no delusional material present  Thought process-linear, organized.  Suicidality: No SI  Homicidality: No HI  Perception: No AH/VH  Insight-fair   Judgement-fair    Lab Results (last 24 hours)       ** No results found for the last 24 hours. **               Imaging Results (Last 24 Hours)       ** No results found for the last 24 hours. **               ECG/EMG Results (most recent)       Procedure Component Value Units Date/Time    ECG 12 Lead Other; Baseline Cardiac Status [[id number]] Collected: 24 0206     Updated: 24 1020     QT Interval 378 ms      QTC Interval 504 ms     Narrative:      Test Reason : Other~  Blood Pressure :   */*   mmHG  Vent. Rate : 107 BPM     Atrial Rate : 107 BPM     P-R Int : 130 ms          QRS Dur :  78 ms      QT Int : 378 ms       P-R-T Axes :  36   " 3  36 degrees     QTc Int : 504 ms    Sinus tachycardia with premature atrial complexes with aberrant conduction  Cannot rule out Anterior infarct , age undetermined  Abnormal ECG  No previous ECGs available  Confirmed by Natty Polanco ([id number]) on 7/12/2024 10:19:03 AM    Referred By:            Confirmed By: Natty Polanco    ECG 12 Lead QT Measurement [[id number]] Collected: 07/13/24 1437     Updated: 07/13/24 1939     QT Interval 448 ms      QTC Interval 516 ms     Narrative:      Test Reason : QT Measurement  Blood Pressure :   */*   mmHG  Vent. Rate :  80 BPM     Atrial Rate :  80 BPM     P-R Int : 134 ms          QRS Dur :  76 ms      QT Int : 448 ms       P-R-T Axes :  58  29  50 degrees     QTc Int : 516 ms    Normal sinus rhythm  Prolonged QT  Abnormal ECG  When compared with ECG of 13-JUL-2024 14:37, (Unconfirmed)  No significant change was found  Confirmed by Maximo Pike ([id number]) on 7/13/2024 7:38:18 PM    Referred By: MARLA           Confirmed By: Maximo Pike    ECG 12 Lead QT Measurement [[id number]] Collected: 07/14/24 0710     Updated: 07/14/24 1958     QT Interval 400 ms      QTC Interval 497 ms     Narrative:      Test Reason : QT Measurement  Blood Pressure :   */*   mmHG  Vent. Rate :  93 BPM     Atrial Rate :  93 BPM     P-R Int : 140 ms          QRS Dur :  74 ms      QT Int : 400 ms       P-R-T Axes :  65  61  68 degrees     QTc Int : 497 ms    Normal sinus rhythm  Nonspecific ST abnormality  Prolonged QT  Abnormal ECG  When compared with ECG of 13-JUL-2024 14:37,  No significant change was found  Confirmed by Maximo Pike ([id number]) on 7/14/2024 7:57:53 PM    Referred By: DAYANA           Confirmed By: Maximo Pike             ALLERGIES: Bupropion    Medication Review:   Scheduled Medications:  B-complex with vitamin C, 2 tablet, Oral, Daily  empagliflozin, 25 mg, Oral, Daily  furosemide, 40 mg, Oral, Daily  metoprolol succinate XL, 50 mg, Oral, Daily  montelukast, 10 mg,  Oral, Nightly  multivitamin with minerals, 1 tablet, Oral, Daily  nicotine, 1 patch, Transdermal, Q24H  sacubitril-valsartan, 1 tablet, Oral, BID  sucralfate, 1 g, Oral, TID  thiamine, 100 mg, Oral, Daily         PRN Medications:    acetaminophen    aluminum-magnesium hydroxide-simethicone    benzonatate    benztropine **OR** benztropine    famotidine    hydrOXYzine    ibuprofen    loperamide    LORazepam    magnesium hydroxide    ondansetron ODT    polyethylene glycol    sodium chloride    traZODone   All medications reviewed.    ASSESSMENT & PLAN:    Alcohol use disorder, severe, dependence  Alcohol withdrawal delirium resolved  - Ativan detox completed, continue to monitor as patient was in DTs. If he continues to do well he may be discharged tomorrow.   - Thiamine and folate      Prolonged Qtc  - Patient asymptomatic   - Hold hydroxyzine, ondansetron and trazodone  - Continue to monitor      Skin wounds  - Patient has multiple lesions on scalp, neck, left antecubital, left upper sternal, upper thoracic spine  - Wound care consult and appreciate recommendations     Nicotine use disorder  - Encourage cessation       COPD  - Oxygen per nasal canula as needed.      Special precautions: Special Precautions Level 4 (q30 min checks).    Behavioral Health Treatment Plan and Problem List: I have reviewed and approved the Behavioral Health Treatment Plan and Problem list.  The patient has had a chance to review and agrees with the treatment plan.    Copied text in portions of this note has been reviewed and is accurate as of 07/17/24         Clinician:  Clint He MD  07/17/24  13:40 EDT

## 2024-07-18 VITALS
RESPIRATION RATE: 18 BRPM | TEMPERATURE: 98.1 F | SYSTOLIC BLOOD PRESSURE: 149 MMHG | OXYGEN SATURATION: 97 % | HEART RATE: 87 BPM | BODY MASS INDEX: 25.96 KG/M2 | DIASTOLIC BLOOD PRESSURE: 94 MMHG | HEIGHT: 65 IN | WEIGHT: 155.8 LBS

## 2024-07-18 PROCEDURE — 99239 HOSP IP/OBS DSCHRG MGMT >30: CPT | Performed by: PSYCHIATRY & NEUROLOGY

## 2024-07-18 RX ORDER — ACETAMINOPHEN 650 MG
TABLET, EXTENDED RELEASE ORAL AS NEEDED
Qty: 237 ML | Refills: 0 | Status: SHIPPED | OUTPATIENT
Start: 2024-07-18

## 2024-07-18 RX ADMIN — Medication 1 TABLET: at 08:17

## 2024-07-18 RX ADMIN — Medication 2 TABLET: at 08:17

## 2024-07-18 RX ADMIN — METOPROLOL SUCCINATE 50 MG: 50 TABLET, EXTENDED RELEASE ORAL at 08:17

## 2024-07-18 RX ADMIN — SUCRALFATE 1 G: 1 TABLET ORAL at 08:17

## 2024-07-18 RX ADMIN — EMPAGLIFLOZIN 25 MG: 25 TABLET, FILM COATED ORAL at 08:17

## 2024-07-18 RX ADMIN — FUROSEMIDE 40 MG: 20 TABLET ORAL at 08:17

## 2024-07-18 RX ADMIN — SACUBITRIL AND VALSARTAN 1 TABLET: 24; 26 TABLET, FILM COATED ORAL at 08:17

## 2024-07-18 RX ADMIN — Medication 100 MG: at 08:17

## 2024-07-18 NOTE — CASE MANAGEMENT/SOCIAL WORK
Patient Name:  Raudel Harden  YOB: 1960  MRN: [id number]  Admit Date:  7/12/2024    Patient expected to discharge home on this date, family to provide transportation. Patient reports improvement in mood and withdrawal symptoms, denies SI/HI/AVH. Patient is scheduled for IOP with [facility] in [place]. Healthy coping skills and relapse prevention have been reviewed. Patient has been assisted with identifying risk factors which would indicate the need for higher level of care including thoughts to harm self or others and/or self-harming behavior. Encouraged patient to call [phone] or present to the nearest emergency room should any of these events occur. No other needs identified.     Electronically signed by:  PHI Lauren  07/18/24 09:20 EDT

## 2024-07-18 NOTE — PLAN OF CARE
Goal Outcome Evaluation:  Plan of Care Reviewed With: patient  Patient Agreement with Plan of Care: agrees     Progress: improving  Outcome Evaluation: Pt discharged home with outpatient resource with family.

## 2024-07-18 NOTE — DISCHARGE SUMMARY
":  1960  MRN:  [id number]  Visit Number:  [id number]      Date of Admission:2024   Date of Discharge:  2024    Discharge Diagnosis:  Principal Problem:    Alcohol use disorder, severe, dependence  Active Problems:    Alcohol withdrawal    COPD    Skin wounds    CHF    Admission Diagnosis:  Alcohol abuse [F10.10]     HPI  Raudel Harden is a 64 y.o. male who was admitted on 2024 with complaints of alcohol use and withdrawals.   For details please see H&P dated 24.     Hospital Course  Patient is a 64 y.o. male presented with alcohol use and withdrawals. The patient was admitted to the detox recovery unit and started on Ativan detox. The patient was continued on his home medications. The patient has some sores on skin  on his scalp, neck, left arm, sternum and upper back. Wound care consult was done and the wounds were treated  with betadine except the left sternal wound which was treated with Thera-honey covered with paper borderl island dressing daily. The patient's wound improved over time. The patient developed DTs and needed increased frequency of Ativan doses and he eventually was able to sleep through the night and the next day his confusion cleared. The patient continued to do well and by the end of detox felt ready to go home. His plan was to go to outpatient treatment and follow-up and after care plan was set up for him.  He was also provided wound care supplies and instructions to perform wound care at home.    Mental Status Exam upon discharge:   Mood \"good\"   Affect-congruent, appropriate, stable  Thought Content-goal directed, no delusional material present  Thought process-linear, organized.  Suicidality: No SI  Homicidality: No HI  Perception: No AH/VH    Procedures Performed         Consults:   Consults       No orders found from 2024 to 2024.            Pertinent Test Results:   Admission on 2024   Component Date Value Ref Range Status    Hepatitis B " Surface Ag 07/12/2024 Non-Reactive  Non-Reactive Final    Hep A IgM 07/12/2024 Non-Reactive  Non-Reactive Final    Hep B C IgM 07/12/2024 Non-Reactive  Non-Reactive Final    Hepatitis C Ab 07/12/2024 Non-Reactive  Non-Reactive Final    QT Interval 07/12/2024 378  ms Final    QTC Interval 07/12/2024 504  ms Final    QT Interval 07/13/2024 448  ms Final    QTC Interval 07/13/2024 516  ms Final    QT Interval 07/14/2024 400  ms Final    QTC Interval 07/14/2024 497  ms Final   Admission on 07/11/2024, Discharged on 07/12/2024   Component Date Value Ref Range Status    Ethanol 07/11/2024 400 (H)  0 - 10 mg/dL Final    Ethanol % 07/11/2024 0.400  % Final    Glucose 07/11/2024 105 (H)  65 - 99 mg/dL Final    BUN 07/11/2024 13  8 - 23 mg/dL Final    Creatinine 07/11/2024 0.75 (L)  0.76 - 1.27 mg/dL Final    Sodium 07/11/2024 144  136 - 145 mmol/L Final    Potassium 07/11/2024 3.6  3.5 - 5.2 mmol/L Final    Chloride 07/11/2024 104  98 - 107 mmol/L Final    CO2 07/11/2024 26.9  22.0 - 29.0 mmol/L Final    Calcium 07/11/2024 8.7  8.6 - 10.5 mg/dL Final    Total Protein 07/11/2024 7.3  6.0 - 8.5 g/dL Final    Albumin 07/11/2024 3.5  3.5 - 5.2 g/dL Final    ALT (SGPT) 07/11/2024 25  1 - 41 U/L Final    AST (SGOT) 07/11/2024 75 (H)  1 - 40 U/L Final    Alkaline Phosphatase 07/11/2024 109  39 - 117 U/L Final    Total Bilirubin 07/11/2024 0.3  0.0 - 1.2 mg/dL Final    Globulin 07/11/2024 3.8  gm/dL Final    A/G Ratio 07/11/2024 0.9  g/dL Final    BUN/Creatinine Ratio 07/11/2024 17.3  7.0 - 25.0 Final    Anion Gap 07/11/2024 13.1  5.0 - 15.0 mmol/L Final    eGFR 07/11/2024 102.0  >60.0 mL/min/1.73 Final    Color, UA 07/11/2024 Yellow  Yellow, Straw Final    Appearance, UA 07/11/2024 Clear  Clear Final    pH, UA 07/11/2024 6.5  5.0 - 8.0 Final    Specific Gravity, UA 07/11/2024 1.011  1.005 - 1.030 Final    Glucose, UA 07/11/2024 500 mg/dL (2+) (A)  Negative Final    Ketones, UA 07/11/2024 Negative  Negative Final    Bilirubin, UA  07/11/2024 Negative  Negative Final    Blood, UA 07/11/2024 Negative  Negative Final    Protein, UA 07/11/2024 Trace (A)  Negative Final    Leuk Esterase, UA 07/11/2024 Negative  Negative Final    Nitrite, UA 07/11/2024 Negative  Negative Final    Urobilinogen, UA 07/11/2024 0.2 E.U./dL  0.2 - 1.0 E.U./dL Final    THC, Screen, Urine 07/11/2024 Positive (A)  Negative Final    Phencyclidine (PCP), Urine 07/11/2024 Negative  Negative Final    Cocaine Screen, Urine 07/11/2024 Negative  Negative Final    Methamphetamine, Ur 07/11/2024 Negative  Negative Final    Opiate Screen 07/11/2024 Negative  Negative Final    Amphetamine Screen, Urine 07/11/2024 Negative  Negative Final    Benzodiazepine Screen, Urine 07/11/2024 Negative  Negative Final    Tricyclic Antidepressants Screen 07/11/2024 Negative  Negative Final    Methadone Screen, Urine 07/11/2024 Negative  Negative Final    Barbiturates Screen, Urine 07/11/2024 Negative  Negative Final    Oxycodone Screen, Urine 07/11/2024 Negative  Negative Final    Buprenorphine, Screen, Urine 07/11/2024 Negative  Negative Final    Magnesium 07/11/2024 2.0  1.6 - 2.4 mg/dL Final    WBC 07/11/2024 9.64  3.40 - 10.80 10*3/mm3 Final    RBC 07/11/2024 3.37 (L)  4.14 - 5.80 10*6/mm3 Final    Hemoglobin 07/11/2024 10.1 (L)  13.0 - 17.7 g/dL Final    Hematocrit 07/11/2024 32.3 (L)  37.5 - 51.0 % Final    MCV 07/11/2024 95.8  79.0 - 97.0 fL Final    MCH 07/11/2024 30.0  26.6 - 33.0 pg Final    MCHC 07/11/2024 31.3 (L)  31.5 - 35.7 g/dL Final    RDW 07/11/2024 19.5 (H)  12.3 - 15.4 % Final    RDW-SD 07/11/2024 66.7 (H)  37.0 - 54.0 fl Final    MPV 07/11/2024 8.8  6.0 - 12.0 fL Final    Platelets 07/11/2024 219  140 - 450 10*3/mm3 Final    Neutrophil % 07/11/2024 51.8  42.7 - 76.0 % Final    Lymphocyte % 07/11/2024 32.5  19.6 - 45.3 % Final    Monocyte % 07/11/2024 8.2  5.0 - 12.0 % Final    Eosinophil % 07/11/2024 4.9  0.3 - 6.2 % Final    Basophil % 07/11/2024 2.1 (H)  0.0 - 1.5 % Final     Immature Grans % 07/11/2024 0.5  0.0 - 0.5 % Final    Neutrophils, Absolute 07/11/2024 5.00  1.70 - 7.00 10*3/mm3 Final    Lymphocytes, Absolute 07/11/2024 3.13 (H)  0.70 - 3.10 10*3/mm3 Final    Monocytes, Absolute 07/11/2024 0.79  0.10 - 0.90 10*3/mm3 Final    Eosinophils, Absolute 07/11/2024 0.47 (H)  0.00 - 0.40 10*3/mm3 Final    Basophils, Absolute 07/11/2024 0.20  0.00 - 0.20 10*3/mm3 Final    Immature Grans, Absolute 07/11/2024 0.05  0.00 - 0.05 10*3/mm3 Final    nRBC 07/11/2024 0.0  0.0 - 0.2 /100 WBC Final    Fentanyl, Urine 07/11/2024 Negative  Negative Final    Ethanol 07/11/2024 237 (H)  0 - 10 mg/dL Final    Ethanol % 07/11/2024 0.237  % Final    Ethanol 07/11/2024 109 (H)  0 - 10 mg/dL Final    Ethanol % 07/11/2024 0.109  % Final    Ethanol 07/11/2024 67 (H)  0 - 10 mg/dL Final    Ethanol % 07/11/2024 0.067  % Final        Condition on Discharge:  improved    Vital Signs  Temp:  [98.1 °F (36.7 °C)-98.7 °F (37.1 °C)] 98.1 °F (36.7 °C)  Heart Rate:  [] 118  Resp:  [18] 18  BP: (118-157)/(82-93) 118/82      Discharge Disposition:  Home or Self Care    Discharge Medications:     Discharge Medications        Continue These Medications        Instructions Start Date   dapagliflozin Propanediol 10 MG tablet   10 mg, Oral, Daily      furosemide 40 MG tablet  Commonly known as: LASIX   40 mg, Oral, Daily      metoprolol succinate XL 50 MG 24 hr tablet  Commonly known as: TOPROL-XL   50 mg, Oral, Daily      montelukast 10 MG tablet  Commonly known as: SINGULAIR   10 mg, Oral, Nightly      sacubitril-valsartan 24-26 MG tablet  Commonly known as: ENTRESTO   1 tablet, Oral, 2 Times Daily      sucralfate 1 g tablet  Commonly known as: CARAFATE   1 g, Oral, 3 times daily               Discharge Diet: Regular     Activity at Discharge: As tolerated     Follow-up Appointments  [facility]  [address] [place] [address] [phone]      Time: I spent  >30 minutes on this discharge activity  which included: face-to-face encounter with the patient, reviewing the data in the system, coordination of the care with the nursing staff as well as consultants, documentation, and entering orders.        Clinician:   Clint He MD  07/18/24  10:45 EDT

## 2024-07-18 NOTE — DISCHARGE INSTRUCTIONS
Shower daily. Apply betadine to all affected lesions, sores daily and leave open-to-air. Monitor for symptoms of infection - redness, pus, drainage, fever.

## 2024-07-18 NOTE — PLAN OF CARE
Goal Outcome Evaluation:  Plan of Care Reviewed With: patient  Patient Agreement with Plan of Care: agrees     Progress: improving       Pt reports anxiety 5/10, denies depression, and cravings 5/10.  Appetite is good. Given Trazodone prn medication.